# Patient Record
Sex: MALE | Race: WHITE | NOT HISPANIC OR LATINO | Employment: OTHER | URBAN - METROPOLITAN AREA
[De-identification: names, ages, dates, MRNs, and addresses within clinical notes are randomized per-mention and may not be internally consistent; named-entity substitution may affect disease eponyms.]

---

## 2019-05-30 ENCOUNTER — OFFICE VISIT (OUTPATIENT)
Dept: FAMILY MEDICINE | Facility: CLINIC | Age: 64
End: 2019-05-30
Payer: COMMERCIAL

## 2019-05-30 VITALS — DIASTOLIC BLOOD PRESSURE: 88 MMHG | SYSTOLIC BLOOD PRESSURE: 138 MMHG

## 2019-05-30 DIAGNOSIS — D48.5 NEOPLASM OF UNCERTAIN BEHAVIOR OF SKIN: Primary | ICD-10-CM

## 2019-05-30 DIAGNOSIS — Z85.828 HISTORY OF BASAL CELL CARCINOMA: ICD-10-CM

## 2019-05-30 PROCEDURE — 88305 TISSUE EXAM BY PATHOLOGIST: CPT | Mod: TC | Performed by: FAMILY MEDICINE

## 2019-05-30 PROCEDURE — 11301 SHAVE SKIN LESION 0.6-1.0 CM: CPT | Mod: 59 | Performed by: FAMILY MEDICINE

## 2019-05-30 PROCEDURE — 99203 OFFICE O/P NEW LOW 30 MIN: CPT | Mod: 25 | Performed by: FAMILY MEDICINE

## 2019-05-30 PROCEDURE — 11310 SHAVE SKIN LESION 0.5 CM/<: CPT | Mod: 51 | Performed by: FAMILY MEDICINE

## 2019-05-30 PROCEDURE — 11301 SHAVE SKIN LESION 0.6-1.0 CM: CPT | Performed by: FAMILY MEDICINE

## 2019-05-30 NOTE — PROGRESS NOTES
[facility] - PRIMARY CARE SKIN    CC: Lesion(s)  SUBJECTIVE:   Christopher Velazquez is a(n) 64 year old male who presents to clinic today because of various lesions. He does not want a total body skin exam .    Bothersome lesions noticed by the patient or other skin concerns :  Issue One: Lesions are noted on the face, left leg, and left arm.  A spot on the left arm will recurrently scab up.  A spot on the left posterior leg is of uncertain duration.  A spot on the left face occasionally bleeds and often turns red.    Personal Medical History  Skin cancer: YES - basal cell carcinoma on left ear  Eczema Psoriasis Autoimmune   NO NO NO     Family Medical History  Skin cancer: NO - unsure if father had any skin cancer  Eczema Psoriasis Autoimmune   NO NO NO     Sun Exposure History  Previous history of significant sun exposure:  Blistering sunburns: YES.  Tanning beds: YES - when younger.  Sunscreen usage: YES, SPF: 20.  UV-protective clothing usage: NO.  Wide-brimmed hat usage: NO.  UV-protective sunglasses usage: YES.  Mid-day sun avoidance: NO.    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    Refer to electronic medical record (EMR) for past medical history and medications.    INTEGUMENTARY/SKIN: POSITIVE for changing lesions  ROS: 14 point review of systems was negative except the symptoms listed above in the HPI.    This document serves as a record of the services and decisions personally performed and made by Danna Moon MD and was created by Agustín Montaño, a trained medical scribe, based on personal observations and provider statements to the medical scribe.  May 30, 2019 8:35 AM   Agustín Montaño    OBJECTIVE:   GENERAL: healthy, alert and no distress.  SKIN: Perales Skin Type - I-II.  Face, Arms, Legs examined. The dermatoscope was used to help evaluate pigmented lesions.  Skin Pertinent Findings:  Left dorsal mid-forearm: 6 mm in size erythematous slightly raised lesion with a 3 mm  "area of superficial erosion. ? Lichen simplex chronicus with irritation ? Squamous cell carcinoma ? Basal cell carcinoma ? Other      Left distal posterior leg, 13 cm proximal to the calcaneus: 6 mm in size irregularly pigmented brown macule. ? Atypical nevus ? Other      Left proximal posterior leg, 7 cm inferior to posterior knee: 6 mm in size erythematous smooth lightly scaly indurated lesion. ? Squamous cell carcinoma ? Basal cell carcinoma ? Other      Left cheek, 2 cm inferior to left mid-lower eyelid: 2 mm in size smooth raised erythematous lesion. ? Basal cell carcinoma ? Other        ASSESSMENT:     Encounter Diagnoses   Name Primary?     History of basal cell carcinoma Yes     Neoplasm of uncertain behavior of skin      MDM: .  Discussed possible Mohs procedure if pathology indicates skin cancer.    PLAN:   Patient Instructions   FUTURE APPOINTMENTS  Follow up in 1 year(s) for a full-body skin cancer screening.    SUN PROTECTION INSTRUCTIONS  Sun damage can lead to skin cancer and premature aging of the skin.      The best way to protect from sun damage to your skin is to avoid the sun during peak hours (10 am - 2 pm) even on overcast days.    Never use tanning beds. Tanning beds are associated with much higher risks of skin cancer.    All tanning damages the skin. Aim for ivory skin year round and you will have less trouble with your skin in years to come. There is no merit in getting \"a base tan\" before a warm weather vacation, as any tanning indicates your body's response to sun damage.    Stop smoking. Smokers have higher rates of skin cancer and also have premature skin wrinkling.    Use UPF sun-protective clothing, which while more expensive initially provides longer lasting coverage without having to worry about remembering to re-apply.  1. Wear a wide-brimmed hat and sunglasses.   2. Wear sun-protective clothing.  [employer] and other [employer] make sun protective clothing that are stylish, " "comfortable and cool.   [employer] and other [employer] make UV arm sleeves suitable for golfing, gardening and other activities.    Sunscreen instructions:  1. Use sunscreens with Zinc Oxide, Titanium Dioxide or Avobenzone to protect from UVA rays.  2. Use SPF 30-50+ to protect from UVB rays.  3. Re-apply every 2 hours even if water resistant.  4. Apply on your face every day even when cloudy and even in the winter. UVA \"aging rays\" penetrate window glass and are just as strong in the winter as in the summer.    FYI  You should use about 3 tablespoons of sunscreen to protect your whole body. Thus a typical eight ounce bottle of sunscreen should last 4 applications. Remember, that the SPF rating is compromised if you don t apply enough. Most people only apply 1/2 - 1/3 of the amount they need. Also don t forget areas such as your ears, feet, upper back and harder to reach places. Keep in mind that these amounts should be increased for larger body sizes.    Sunscreens with titanium dioxide and/or zinc oxide in the active ingredients are physical blockers as opposed to chemical blockers. Chemical-free sunscreens should not irritate the skin.    Spray-on sunscreens may be used for touch-up application only, not as a base layer. Also, use with caution around small children due to inhalation risk.    SPF means sun protection factor, which is just the degree to which the sunscreen can protect against UVB rays. There is no rating system for UVA rays. SPF is calculated as the time skin will burn when sunscreen is applied vs. skin without sunscreen.    Water resistant sunscreens should be re-applied every 1-2 hours.    Product Recommendations:    Consider use of sunscreen sticks with Zinc Oxide and Titanium Dioxide active ingredients such as Neutrogena Pure&Free Baby Sunscreen Stick.    Good examples include: Blue Terence, EltaMD, Solbar    Good daily moisturizers with SPF: Vanicream, CeraVe.    For sensitive skin, " consider : SkinMedica Essential Defense Mineral Shield Broad Spectrum SPF 35    Men: consider use of Neutrogena Triple Protect Facial Lotion    Avoid retinyl palmitate products.  Avoid combination products that include both sunscreen and insect repellant, as sunscreen should be applied every 2 hours, but insect repellant should not be applied as frequently.    For more information:  https://www.skincancer.org/prevention/sun-protection/sunscreen/sunscreens-safe-and-effective    SKIN CANCER SELF-EXAM INSTRUCTIONS  Check every month in the mirror or with a household member. Be aware of any changes, especially bleeding or tenderness. Also, make sure to check your nails for color changes after removal of nail polish.    For melanoma, check for:  A - Asymmetry. One half unlike the other half.  B - Border. Irregular, scalloped, ragged, notched, blurred or poorly defined borders.  C - Color. Color variations from one area to another, with shades of tan, brown and/or black present. Sometimes white, red or blue.  D - Diameter. Greater than 6 mm (about the size of a pencil eraser). Any new growth of a mole should be concerning and be evaluated.  E - Evolving. A mole or skin lesion that looks different from the rest or is changing in size, shape or color.    For basal cell carcinoma and squamous cell carcinoma, check for:    Sores, shiny bumps, nodules, scaly lesions, or wart-like growths that are itchy, tender, crusting, scabbing, eroding, oozing or bleeding.    Open sores/wounds or reddish/irritated areas that do not heal within 2-3 weeks.    Scar-like areas that are white, yellow or waxy in color.      The patient was counseled about sunscreens and sun avoidance. The patient was counseled to check the skin regularly and report any lesion that is new, changing, itching, scabbing, bleeding or otherwise bothersome. The patient was discharged ambulatory and in stable condition.    TT: 25 minutes.  CT: 15 minutes.    The  information in this document, created by the medical scribe for me, accurately reflects the services I personally performed and the decisions made by me. I have reviewed and approved this document for accuracy prior to leaving the patient care area.  May 30, 2019 8:35 AM  Danna Moon MD  [facility] [place]

## 2019-05-30 NOTE — LETTER
5/30/2019         RE: Christopher Velazquez  [address] [id number]        Dear Colleague,    Thank you for referring your patient, Christopher Velazquez, to the [facility]. Please see a copy of my visit note below.    [facility] - PRIMARY CARE SKIN    CC: Lesion(s)  SUBJECTIVE:   Christopher Velazquez is a(n) 64 year old male who presents to clinic today because of various lesions. He does not want a total body skin exam .    Bothersome lesions noticed by the patient or other skin concerns :  Issue One: Lesions are noted on the face, left leg, and left arm.  A spot on the left arm will recurrently scab up.  A spot on the left posterior leg is of uncertain duration.  A spot on the left face occasionally bleeds and often turns red.    Personal Medical History  Skin cancer: YES - basal cell carcinoma on left ear  Eczema Psoriasis Autoimmune   NO NO NO     Family Medical History  Skin cancer: NO - unsure if father had any skin cancer  Eczema Psoriasis Autoimmune   NO NO NO     Sun Exposure History  Previous history of significant sun exposure:  Blistering sunburns: YES.  Tanning beds: YES - when younger.  Sunscreen usage: YES, SPF: 20.  UV-protective clothing usage: NO.  Wide-brimmed hat usage: NO.  UV-protective sunglasses usage: YES.  Mid-day sun avoidance: NO.    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    Refer to electronic medical record (EMR) for past medical history and medications.    INTEGUMENTARY/SKIN: POSITIVE for changing lesions  ROS: 14 point review of systems was negative except the symptoms listed above in the HPI.    This document serves as a record of the services and decisions personally performed and made by Danna Moon MD and was created by Agustín Montaño, a trained medical scribe, based on personal observations and provider statements to the medical scribe.  May 30, 2019 8:35 AM   Agustín Montaño    OBJECTIVE:   GENERAL: healthy, alert and no  "distress.  SKIN: Perales Skin Type - I-II.  Face, Arms, Legs examined. The dermatoscope was used to help evaluate pigmented lesions.  Skin Pertinent Findings:  Left dorsal mid-forearm: 6 mm in size erythematous slightly raised lesion with a 3 mm area of superficial erosion. ? Lichen simplex chronicus with irritation ? Squamous cell carcinoma ? Basal cell carcinoma ? Other      Left distal posterior leg, 13 cm proximal to the calcaneus: 6 mm in size irregularly pigmented brown macule. ? Atypical nevus ? Other      Left proximal posterior leg, 7 cm inferior to posterior knee: 6 mm in size erythematous smooth lightly scaly indurated lesion. ? Squamous cell carcinoma ? Basal cell carcinoma ? Other      Left cheek, 2 cm inferior to left mid-lower eyelid: 2 mm in size smooth raised erythematous lesion. ? Basal cell carcinoma ? Other        ASSESSMENT:     Encounter Diagnoses   Name Primary?     History of basal cell carcinoma Yes     Neoplasm of uncertain behavior of skin      MDM: .  Discussed possible Mohs procedure if pathology indicates skin cancer.    PLAN:   Patient Instructions   FUTURE APPOINTMENTS  Follow up in 1 year(s) for a full-body skin cancer screening.    SUN PROTECTION INSTRUCTIONS  Sun damage can lead to skin cancer and premature aging of the skin.      The best way to protect from sun damage to your skin is to avoid the sun during peak hours (10 am - 2 pm) even on overcast days.    Never use tanning beds. Tanning beds are associated with much higher risks of skin cancer.    All tanning damages the skin. Aim for ivory skin year round and you will have less trouble with your skin in years to come. There is no merit in getting \"a base tan\" before a warm weather vacation, as any tanning indicates your body's response to sun damage.    Stop smoking. Smokers have higher rates of skin cancer and also have premature skin wrinkling.    Use UPF sun-protective clothing, which while more expensive initially " "provides longer lasting coverage without having to worry about remembering to re-apply.  1. Wear a wide-brimmed hat and sunglasses.   2. Wear sun-protective clothing.  [employer] and other [employer] make sun protective clothing that are stylish, comfortable and cool.   [employer] and other [employer] make UV arm sleeves suitable for golfing, gardening and other activities.    Sunscreen instructions:  1. Use sunscreens with Zinc Oxide, Titanium Dioxide or Avobenzone to protect from UVA rays.  2. Use SPF 30-50+ to protect from UVB rays.  3. Re-apply every 2 hours even if water resistant.  4. Apply on your face every day even when cloudy and even in the winter. UVA \"aging rays\" penetrate window glass and are just as strong in the winter as in the summer.    FYI  You should use about 3 tablespoons of sunscreen to protect your whole body. Thus a typical eight ounce bottle of sunscreen should last 4 applications. Remember, that the SPF rating is compromised if you don t apply enough. Most people only apply 1/2 - 1/3 of the amount they need. Also don t forget areas such as your ears, feet, upper back and harder to reach places. Keep in mind that these amounts should be increased for larger body sizes.    Sunscreens with titanium dioxide and/or zinc oxide in the active ingredients are physical blockers as opposed to chemical blockers. Chemical-free sunscreens should not irritate the skin.    Spray-on sunscreens may be used for touch-up application only, not as a base layer. Also, use with caution around small children due to inhalation risk.    SPF means sun protection factor, which is just the degree to which the sunscreen can protect against UVB rays. There is no rating system for UVA rays. SPF is calculated as the time skin will burn when sunscreen is applied vs. skin without sunscreen.    Water resistant sunscreens should be re-applied every 1-2 hours.    Product Recommendations:    Consider use of sunscreen " sticks with Zinc Oxide and Titanium Dioxide active ingredients such as Neutrogena Pure&Free Baby Sunscreen Stick.    Good examples include: Blue Lizard, EltaMD, Solbar    Good daily moisturizers with SPF: Vanicream, CeraVe.    For sensitive skin, consider : SkinMedica Essential Defense Mineral Shield Broad Spectrum SPF 35    Men: consider use of Neutrogena Triple Protect Facial Lotion    Avoid retinyl palmitate products.  Avoid combination products that include both sunscreen and insect repellant, as sunscreen should be applied every 2 hours, but insect repellant should not be applied as frequently.    For more information:  https://www.skincancer.org/prevention/sun-protection/sunscreen/sunscreens-safe-and-effective    SKIN CANCER SELF-EXAM INSTRUCTIONS  Check every month in the mirror or with a household member. Be aware of any changes, especially bleeding or tenderness. Also, make sure to check your nails for color changes after removal of nail polish.    For melanoma, check for:  A - Asymmetry. One half unlike the other half.  B - Border. Irregular, scalloped, ragged, notched, blurred or poorly defined borders.  C - Color. Color variations from one area to another, with shades of tan, brown and/or black present. Sometimes white, red or blue.  D - Diameter. Greater than 6 mm (about the size of a pencil eraser). Any new growth of a mole should be concerning and be evaluated.  E - Evolving. A mole or skin lesion that looks different from the rest or is changing in size, shape or color.    For basal cell carcinoma and squamous cell carcinoma, check for:    Sores, shiny bumps, nodules, scaly lesions, or wart-like growths that are itchy, tender, crusting, scabbing, eroding, oozing or bleeding.    Open sores/wounds or reddish/irritated areas that do not heal within 2-3 weeks.    Scar-like areas that are white, yellow or waxy in color.      The patient was counseled about sunscreens and sun avoidance. The patient was  counseled to check the skin regularly and report any lesion that is new, changing, itching, scabbing, bleeding or otherwise bothersome. The patient was discharged ambulatory and in stable condition.    TT: 25 minutes.  CT: 15 minutes.    The information in this document, created by the medical scribe for me, accurately reflects the services I personally performed and the decisions made by me. I have reviewed and approved this document for accuracy prior to leaving the patient care area.  May 30, 2019 8:35 AM  Danna Moon MD  [facility] [place]    Again, thank you for allowing me to participate in the care of your patient.        Sincerely,        Danna Moon MD

## 2019-05-30 NOTE — PROCEDURES
Name : Shave Excision  Indication : Excision of tissue for pathology evaluation.  Location(s) : Left dorsal mid-forearm: 6 mm in size erythematous slightly raised lesion with a 3 mm area of superficial erosion. ? Lichen simplex chronicus with irritation ? Squamous cell carcinoma ? Basal cell carcinoma ? Other.  Completed by : Carla Moon MD  Photo Taken : yes.  Anesthesia : Patient was anesthetized by infiltrating the area surrounding the lesion with 1% lidocaine.   epinephrine [id number] : Yes.  Note : Discussed the risk of pain, infection, scarring, hypo- or hyperpigmentation and recurrence or need for re-treatment. The benefits of treatment and alternative treatments were also discussed.    During this procedure, the universal protocol was utilized. The patient's identity was confirmed by no less than two patient identifiers, correct procedure was verified, correct site was verified and marked as applicable and a final pause was completed.    Sterile technique was used throughout the procedure. The skin was cleaned and prepped with surgical cleanser. Once adequate anesthesia was obtained, the lesion was removed with a deep scallop shave procedure. The specimen was sent to pathology.    Direct pressure and aluminum chloride and monopolar cautery was applied for hemostasis. No bleeding was present upon the completion of the procedure. The wound was coated with antibacterial ointment. A dry sterile dressing was applied. Patient tolerated the procedure well and left in satisfactory condition.    Primary provider and referring provider will be informed regarding the tissue report when it returns.    Name : Shave Excision  Indication : Excision of tissue for pathology evaluation.  Location(s) : Left distal posterior leg, 13 cm proximal to the calcaneus: 6 mm in size irregularly pigmented brown macule. ? Atypical nevus ? Other.  Completed by : Carla Moon MD  Photo Taken : YES.  Anesthesia : Patient was anesthetized  by infiltrating the area surrounding the lesion with 1% lidocaine.   epinephrine [id number] : Yes.  Note : Discussed the risk of pain, infection, scarring, hypo- or hyperpigmentation and recurrence or need for re-treatment. The benefits of treatment and alternative treatments were also discussed.    During this procedure, the universal protocol was utilized. The patient's identity was confirmed by no less than two patient identifiers, correct procedure was verified, correct site was verified and marked as applicable and a final pause was completed.    Sterile technique was used throughout the procedure. The skin was cleaned and prepped with surgical cleanser. Once adequate anesthesia was obtained, the lesion was removed with a deep scallop shave procedure. The specimen was sent to pathology.    Direct pressure and aluminum chloride and monopolar cautery was applied for hemostasis. No bleeding was present upon the completion of the procedure. The wound was coated with antibacterial ointment. A dry sterile dressing was applied. Patient tolerated the procedure well and left in satisfactory condition.    Primary provider and referring provider will be informed regarding the tissue report when it returns.    Name : Shave Excision  Indication : Excision of tissue for pathology evaluation.  Location(s) : Left proximal posterior leg, 7 cm inferior to posterior knee: 6 mm in size erythematous smooth lightly scaly indurated lesion. ? Squamous cell carcinoma ? Basal cell carcinoma ? Other.  Completed by : Carla Moon MD  Photo Taken : yes.  Anesthesia : Patient was anesthetized by infiltrating the area surrounding the lesion with 1% lidocaine.   epinephrine [id number] : Yes.  Note : Discussed the risk of pain, infection, scarring, hypo- or hyperpigmentation and recurrence or need for re-treatment. The benefits of treatment and alternative treatments were also discussed.    During this procedure, the universal protocol was  utilized. The patient's identity was confirmed by no less than two patient identifiers, correct procedure was verified, correct site was verified and marked as applicable and a final pause was completed.    Sterile technique was used throughout the procedure. The skin was cleaned and prepped with surgical cleanser. Once adequate anesthesia was obtained, the lesion was removed with a deep scallop shave procedure. The specimen was sent to pathology.    Direct pressure and aluminum chloride and monopolar cautery was applied for hemostasis. No bleeding was present upon the completion of the procedure. The wound was coated with antibacterial ointment. A dry sterile dressing was applied. Patient tolerated the procedure well and left in satisfactory condition.    Primary provider and referring provider will be informed regarding the tissue report when it returns.    Name : Shave Excision  Indication : Excision of tissue for pathology evaluation.  Location(s) : Left cheek: 2 cm inferior to left mid-lower eyelid: 2 mm in size smooth raised erythematous lesion. ? Basal cell carcinoma ? Other.  Completed by : Carla Moon MD  Photo Taken : yes.  Anesthesia : Patient was anesthetized by infiltrating the area surrounding the lesion with 1% lidocaine.   epinephrine [id number] : Yes.  Note : Discussed the risk of pain, infection, scarring, hypo- or hyperpigmentation and recurrence or need for re-treatment. The benefits of treatment and alternative treatments were also discussed.    During this procedure, the universal protocol was utilized. The patient's identity was confirmed by no less than two patient identifiers, correct procedure was verified, correct site was verified and marked as applicable and a final pause was completed.    Sterile technique was used throughout the procedure. The skin was cleaned and prepped with surgical cleanser. Once adequate anesthesia was obtained, the lesion was removed with a deep scallop shave  procedure. The specimen was sent to pathology.    Direct pressure and aluminum chloride and monopolar cautery was applied for hemostasis. No bleeding was present upon the completion of the procedure. The wound was coated with antibacterial ointment. A dry sterile dressing was applied. Patient tolerated the procedure well and left in satisfactory condition.    Primary provider and referring provider will be informed regarding the tissue report when it returns.

## 2019-05-30 NOTE — PATIENT INSTRUCTIONS
"FUTURE APPOINTMENTS  Follow up for annual full-body skin cancer screenings.  Follow up per pathology report.    WOUND CARE INSTRUCTIONS  1. Wash hands before every dressing change.  2. After 24 hours, change dressing daily.  3. Wash the wound area with a mild soap, then rinse.  4. Gently pat dry with a sterile gauze or Q-tip.  5. Using a Q-tip, apply Vaseline or Aquaphor only over entire wound. Do NOT use Neosporin - as many people react to neomycin.  6. Finally, cover with a bandage or sterile non-stick gauze with micropore paper tape.  7. Repeat once daily until wound has healed.      Soap, water and shampoo will not hurt this area.    Do not go swimming or take baths, but showering is encouraged.    Limit use of the area where the procedure was done for a few days to allow for optimal healing.    If you experience bleeding:  Wash hands and hold firm pressure on the area for 10 minutes without checking to see if the bleeding has stopped. \"Checking\" pulls off the protective wound clot and restarts the bleeding all over again. Re-apply pressure for 10 minutes if necessary to stop bleeding.  Use additional sterile gauze and tape to maintain pressure once bleeding has stopped.  If bleeding continues, then call back to clinic at [phone].    Signs of Infection:  Infection can occur in any area where skin has been disrupted.  If you notice persistent redness, swelling, colored drainage, increasing pain, fever or other signs of infection, please call us at: [phone] and ask to have me or my colleague paged. We will call you back to discuss.    Pathology Results:  You will be notified, generally via letter or MyChart, in approximately 10 days. If there is anything we need to discuss or further treatment needed, I will call you to discuss it.    PATIENT INFORMATION : WOUNDS  During the healing process you will notice a number of changes. All wounds develop a small halo of redness surrounding the wound.  This " "means healing is occurring. Severe itching with extensive redness usually indicates sensitivity to the ointment or bandage tape used to dress the wound.  You should call our office if this develops.      Swelling  and/or discoloration around your surgical site is common, particularly when performed around the eye.    All wounds normally drain.  The larger the wound the more drainage there will be.  After 7-10 days, you will notice the wound beginning to shrink and new skin will begin to grow.  The wound is healed when you can see skin has formed over the entire area.  A healed wound has a healthy, shiny look to the surface and is red to dark pink in color to normalize.  Wounds may take approximately 4-6 weeks to heal.  Larger wounds may take 6-8 weeks. After the wound is healed you may discontinue dressing changes.    You may experience a sensation of tightness as your wound heals. This is normal and will gradually subside.    Your healed wound may be sensitive to temperature changes. This sensitivity improves with time, but if you re having a lot of discomfort, try to avoid temperature extremes.    Patients frequently experience itching after their wound appears to have healed because of the continue healing under the skin.  Plain Vaseline will help relieve the itching.    SUN PROTECTION INSTRUCTIONS  Sun damage can lead to skin cancer and premature aging of the skin.      The best way to protect from sun damage to your skin is to avoid the sun during peak hours (10 am - 2 pm) even on overcast days.    Never use tanning beds. Tanning beds are associated with much higher risks of skin cancer.    All tanning damages the skin. Aim for ivory skin year round and you will have less trouble with your skin in years to come. There is no merit in getting \"a base tan\" before a warm weather vacation, as any tanning indicates your body's response to sun damage.    Stop smoking. Smokers have higher rates of skin cancer and also " "have premature skin wrinkling.    Use UPF sun-protective clothing, which while more expensive initially provides longer lasting coverage without having to worry about remembering to re-apply.  1. Wear a wide-brimmed hat and sunglasses.   2. Wear sun-protective clothing.  [employer] and other [employer] make sun protective clothing that are stylish, comfortable and cool.   [employer] and other [employer] make UV arm sleeves suitable for golfing, gardening and other activities.    Sunscreen instructions:  1. Use sunscreens with Zinc Oxide, Titanium Dioxide or Avobenzone to protect from UVA rays.  2. Use SPF 30-50+ to protect from UVB rays.  3. Re-apply every 2 hours even if water resistant.  4. Apply on your face every day even when cloudy and even in the winter. UVA \"aging rays\" penetrate window glass and are just as strong in the winter as in the summer.    FYI  You should use about 3 tablespoons of sunscreen to protect your whole body. Thus a typical eight ounce bottle of sunscreen should last 4 applications. Remember, that the SPF rating is compromised if you don t apply enough. Most people only apply 1/2 - 1/3 of the amount they need. Also don t forget areas such as your ears, feet, upper back and harder to reach places. Keep in mind that these amounts should be increased for larger body sizes.    Sunscreens with titanium dioxide and/or zinc oxide in the active ingredients are physical blockers as opposed to chemical blockers. Chemical-free sunscreens should not irritate the skin.    Spray-on sunscreens may be used for touch-up application only, not as a base layer. Also, use with caution around small children due to inhalation risk.    SPF means sun protection factor, which is just the degree to which the sunscreen can protect against UVB rays. There is no rating system for UVA rays. SPF is calculated as the time skin will burn when sunscreen is applied vs. skin without sunscreen.    Water resistant " sunscreens should be re-applied every 1-2 hours.    Product Recommendations:    Consider use of sunscreen sticks with Zinc Oxide and Titanium Dioxide active ingredients such as Neutrogena Pure&Free Baby Sunscreen Stick.    Good examples include: Blue Lizard, EltaMD, Solbar    Good daily moisturizers with SPF: Vanicream, CeraVe.    For sensitive skin, consider : SkinMedica Essential Defense Mineral Shield Broad Spectrum SPF 35    Men: consider use of Neutrogena Triple Protect Facial Lotion    Avoid retinyl palmitate products.  Avoid combination products that include both sunscreen and insect repellant, as sunscreen should be applied every 2 hours, but insect repellant should not be applied as frequently.    For more information:  https://www.skincancer.org/prevention/sun-protection/sunscreen/sunscreens-safe-and-effective    SKIN CANCER SELF-EXAM INSTRUCTIONS  Check every month in the mirror or with a household member. Be aware of any changes, especially bleeding or tenderness. Also, make sure to check your nails for color changes after removal of nail polish.    For melanoma, check for:  A - Asymmetry. One half unlike the other half.  B - Border. Irregular, scalloped, ragged, notched, blurred or poorly defined borders.  C - Color. Color variations from one area to another, with shades of tan, brown and/or black present. Sometimes white, red or blue.  D - Diameter. Greater than 6 mm (about the size of a pencil eraser). Any new growth of a mole should be concerning and be evaluated.  E - Evolving. A mole or skin lesion that looks different from the rest or is changing in size, shape or color.    For basal cell carcinoma and squamous cell carcinoma, check for:    Sores, shiny bumps, nodules, scaly lesions, or wart-like growths that are itchy, tender, crusting, scabbing, eroding, oozing or bleeding.    Open sores/wounds or reddish/irritated areas that do not heal within 2-3 weeks.    Scar-like areas that are white,  yellow or waxy in color.

## 2019-06-04 LAB — COPATH REPORT: NORMAL

## 2019-06-12 ENCOUNTER — TELEPHONE (OUTPATIENT)
Dept: FAMILY MEDICINE | Facility: CLINIC | Age: 64
End: 2019-06-12

## 2019-06-12 NOTE — TELEPHONE ENCOUNTER
patient notified of test results and mohs procedure explained- appointment scheduled- letter mailed.    Katelyn SEBASTIAN RN  [facility]  [phone]  [facility]   [phone]

## 2019-06-12 NOTE — TELEPHONE ENCOUNTER
Left message on answering machine for patient to call back.    Katelyn Bateman,RN BSN  [facility]  [phone]

## 2019-06-12 NOTE — LETTER
[facility]  [address]  [address]  [phone]        6/12/2019       [facility]quez  [address] [facility] [id number]      Dear Christopher:    You are scheduled for Mohs Surgery on: Thursday, August 1, 2019 at 7:15 am    Please check in at [facility], [address].     You don't need to arrive more than 5-10 minutes prior to your appointment time.     Be sure to eat a good breakfast and bathe and wash your hair prior to surgery.     If you are taking any anti-coagulants that are prescribed by your Doctor (such as Coumadin/Warfarin, Plavix, Aspirin, Ibuprofen), please continue taking them.     However, if you are taking anti-coagulants over the counter without a Doctor's order for a medical condition, please discontinue them 10 days prior to surgery.     Please wear loose comfortable clothing as it could possibly be 4-6 hours until your surgery is completed depending upon how many layers of tissue need to be removed.      Thank you,    BRIDGETTE James MD

## 2019-06-12 NOTE — TELEPHONE ENCOUNTER
----- Message from Tristan Hearn MD sent at 6/12/2019 11:43 AM CDT -----  Please call and explain :       On the left posterior leg- basal cell carcinoma , because of the location on the lower leg would recommend MOHS with Dr. James because if is a more tissue sparing technique and allows for complete removal of the basal cell carcinoma . The lesions on the other locations were all benign.       Recommend skin exam in 6 months.       Will need referral for Dr. James.    Thank you,   TRISTAN HEARN M.D.

## 2019-08-26 NOTE — PROGRESS NOTES
Surgical Office Location:  [facility]  [address] [place] [id number]

## 2019-08-29 ENCOUNTER — OFFICE VISIT (OUTPATIENT)
Dept: DERMATOLOGY | Facility: CLINIC | Age: 64
End: 2019-08-29
Payer: COMMERCIAL

## 2019-08-29 VITALS — HEART RATE: 61 BPM | DIASTOLIC BLOOD PRESSURE: 97 MMHG | OXYGEN SATURATION: 97 % | SYSTOLIC BLOOD PRESSURE: 153 MMHG

## 2019-08-29 DIAGNOSIS — L81.4 LENTIGO: ICD-10-CM

## 2019-08-29 DIAGNOSIS — L82.1 SEBORRHEIC KERATOSIS: ICD-10-CM

## 2019-08-29 DIAGNOSIS — C44.719 BASAL CELL CARCINOMA (BCC) OF LEFT FOOT: ICD-10-CM

## 2019-08-29 DIAGNOSIS — D18.01 ANGIOMA OF SKIN: ICD-10-CM

## 2019-08-29 DIAGNOSIS — Z85.828 HISTORY OF SKIN CANCER: Primary | ICD-10-CM

## 2019-08-29 PROCEDURE — 17311 MOHS 1 STAGE H/N/HF/G: CPT | Performed by: DERMATOLOGY

## 2019-08-29 PROCEDURE — 99243 OFF/OP CNSLTJ NEW/EST LOW 30: CPT | Mod: 25 | Performed by: DERMATOLOGY

## 2019-08-29 NOTE — PATIENT INSTRUCTIONS
Wound Care Instructions     FOR SUPERFICIAL WOUNDS     [facility] [phone]    [facility] [phone]                       AFTER 24 HOURS YOU SHOULD REMOVE THE BANDAGE AND BEGIN DAILY DRESSING CHANGES AS FOLLOWS:     1) Remove Dressing.     2) Clean and dry the area with tap water using a Q-tip or sterile gauze pad.     3) Apply Vaseline, Aquaphor, Polysporin ointment or Bacitracin ointment over entire wound.  Do NOT use Neosporin ointment.     4) Cover the wound with a band-aid, or a sterile non-stick gauze pad and micropore paper tape      REPEAT THESE INSTRUCTIONS AT LEAST ONCE A DAY UNTIL THE WOUND HAS COMPLETELY HEALED.    It is an old wives tale that a wound heals better when it is exposed to air and allowed to dry out. The wound will heal faster with a better cosmetic result if it is kept moist with ointment and covered with a bandage.    **Do not let the wound dry out.**      Supplies Needed:      *Cotton tipped applicators (Q-tips)    *Polysporin Ointment or Bacitracin Ointment (NOT NEOSPORIN)    *Band-aids or non-stick gauze pads and micropore paper tape.      PATIENT INFORMATION:    During the healing process you will notice a number of changes. All wounds develop a small halo of redness surrounding the wound.  This means healing is occurring. Severe itching with extensive redness usually indicates sensitivity to the ointment or bandage tape used to dress the wound.  You should call our office if this develops.      Swelling  and/or discoloration around your surgical site is common, particularly when performed around the eye.    All wounds normally drain.  The larger the wound the more drainage there will be.  After 7-10 days, you will notice the wound beginning to shrink and new skin will begin to grow.  The wound is healed when you can see skin has formed over the entire area.  A healed wound has a healthy, shiny look to the surface and is red to dark pink in color  to normalize.  Wounds may take approximately 4-6 weeks to heal.  Larger wounds may take 6-8 weeks.  After the wound is healed you may discontinue dressing changes.    You may experience a sensation of tightness as your wound heals. This is normal and will gradually subside.    Your healed wound may be sensitive to temperature changes. This sensitivity improves with time, but if you re having a lot of discomfort, try to avoid temperature extremes.    Patients frequently experience itching after their wound appears to have healed because of the continue healing under the skin.  Plain Vaseline will help relieve the itching.        POSSIBLE COMPLICATIONS    BLEEDIN. Leave the bandage in place.  2. Use tightly rolled up gauze or a cloth to apply direct pressure over the bandage for 30  minutes.  3. Reapply pressure for an additional 30 minutes if necessary  4. Use additional gauze and tape to maintain pressure once the bleeding has stopped.

## 2019-08-29 NOTE — PROGRESS NOTES
Christopher Velazquez , a 64 year old year old male patient, I was asked to see by DR. Fuentes for basal cell carcinoma on left leg. Patient states this has been present for a while.  Patient reports the following symptoms:  Not healing .  Patient reports the following previous treatments none.  Patient reports the following modifying factors none.  Associated symptoms: none.  Patient has no other skin complaints today.  Remainder of the HPI, Meds, PMH, Allergies, FH, and SH was reviewed in chart.    Pertinent Hx:   Non-melanoma skin cancer   Past Medical History:   Diagnosis Date     BCC (basal cell carcinoma of skin)     L ear       Past Surgical History:   Procedure Laterality Date     ARTHROSCOPY SHOULDER RT/LT  1999    L     HC CLOSED TX CLAVICULAR FX W/O MANIPULATION  2003    R     HC REPAIR OF NASAL SEPTUM  1994    septoplasty, rhinoplasty        Family History   Problem Relation Age of Onset     Neurologic Disorder Mother         D:  ALS 75     Family History Negative Father         B; 1920's     Cancer Sister         lung     Family History Negative Brother      Family History Negative Brother      Family History Negative Brother      Family History Negative Brother      Family History Negative Sister      Family History Negative Sister        Social History     Socioeconomic History     Marital status:      Spouse name: Not on file     Number of children: 1     Years of education: Not on file     Highest education level: Not on file   Occupational History     Occupation:      Employer: [employer]   Social Needs     Financial resource strain: Not on file     Food insecurity:     Worry: Not on file     Inability: Not on file     Transportation needs:     Medical: Not on file     Non-medical: Not on file   Tobacco Use     Smoking status: Never Smoker     Smokeless tobacco: Never Used   Substance and Sexual Activity     Alcohol use: Yes     Comment: wine on weekends- 5 drinks/wk     Drug  use: Never     Sexual activity: Yes   Lifestyle     Physical activity:     Days per week: Not on file     Minutes per session: Not on file     Stress: Not on file   Relationships     Social connections:     Talks on phone: Not on file     Gets together: Not on file     Attends Denominational service: Not on file     Active member of club or organization: Not on file     Attends meetings of clubs or organizations: Not on file     Relationship status: Not on file     Intimate partner violence:     Fear of current or ex partner: Not on file     Emotionally abused: Not on file     Physically abused: Not on file     Forced sexual activity: Not on file   Other Topics Concern     Parent/sibling w/ CABG, MI or angioplasty before 65F 55M? Not Asked   Social History Narrative     Not on file       Outpatient Encounter Medications as of 8/29/2019   Medication Sig Dispense Refill     MULTI-VITAMIN OR TABS 1 TABLET DAILY       [DISCONTINUED] HYDROcodone-acetaminophen 5-325 MG per tablet Take 1 tablet by mouth every 4 hours as needed for pain. 21 tablet 0     No facility-administered encounter medications on file as of 8/29/2019.              Review Of Systems  Skin: As above  Eyes: negative  Ears/Nose/Throat: negative  Respiratory: No shortness of breath, dyspnea on exertion, cough, or hemoptysis  Cardiovascular: negative  Gastrointestinal: negative  Genitourinary: negative  Musculoskeletal: negative  Neurologic: negative  Psychiatric: negative  Hematologic/Lymphatic/Immunologic: negative  Endocrine: negative      O:   NAD, WDWN, Alert & Oriented, Mood & Affect wnl, Vitals stable   Here today alone   BP (!) 153/97   Pulse 61   SpO2 97%    General appearance berenice ii   Vitals stable   Alert, oriented and in no acute distress        Stuck on papules and brown macules on trunk and ext    Red papules on trunk  L post ankle 1.4cm red scaly plaque      The remainder of expanded problem focused exam was unremarkable; the following areas  were examined:  scalp/hair, conjunctiva/lids, face, neck, lips, chest, digits/nails, RUE, LUE.      Eyes: Conjunctivae/lids:Normal     ENT: Lips, buccal mucosa, tongue: normal    MSK:Normal    Cardiovascular: peripheral edema none    Pulm: Breathing Normal    Neuro/Psych: Orientation:Normal; Mood/Affect:Normal      A/P:  1. Hx of non-melanoma skin cancer, seborrheic keratosis, eltnigo, angioma  2. Ankle basal cell carcinoma   BENIGN LESIONS DISCUSSED WITH PATIENT:  I discussed the specifics of tumor, prognosis, and genetics of benign lesions.  I explained that treatment of these lesions would be purely cosmetic and not medically neccessary.  I discussed with patient different removal options including excision, cautery and /or laser.      Nature and genetics of benign skin lesions dicussed with patient.  Signs and Symptoms of skin cancer discussed with patient.  Patient encouraged to perform monthly skin exams.  UV precautions reviewed with patient.    Skin care regimen reviewed with patient: Eliminate harsh soaps, i.e. Dial, zest, irsih spring; Mild soaps such as Cetaphil or Dove sensitive skin, avoid hot or cold showers, aggressive use of emollients including vanicream, cetaphil or cerave discussed with patient.    Risks of non-melanoma skin cancer discussed with patient   Return to clinic 6 months    PROCEDURE NOTE  L post ankle 1.4cm basal cell carcinoma   MOHS:   Location    After PGACAC discussed with patient, decision for Mohs surgery was made. Indication for Mohs was Location. Patient confirmed the site with Dr. James.  After anesthesia with [facility], the tumor was excised using standard Mohs technique in 1 stages(s).  CLEAR MARGINS OBTAINED and Final defect size was 2 x 2.1 cm.       REPAIR SECOND INTENT: We discussed the options for wound management in full with the patient including risks/benefits/possible outcomes. Decision made to allow the wound to heal by second intention. EBL minimal; complications  none; wound care routine.  The patient was discharged in good condition and will return in one month or prn for wound evaluation.

## 2019-08-29 NOTE — LETTER
8/29/2019         RE: Christopher Velazquez  [address] [facility] [id number]        Dear Colleague,    Thank you for referring your patient, Christopher Velazquez, to the [facility]. Please see a copy of my visit note below.    Surgical Office Location:  [facility]  [address] [place] [address]      Christopher Velazquez , a 64 year old year old male patient, I was asked to see by DR. Fuentes for basal cell carcinoma on left leg. Patient states this has been present for a while.  Patient reports the following symptoms:  Not healing .  Patient reports the following previous treatments none.  Patient reports the following modifying factors none.  Associated symptoms: none.  Patient has no other skin complaints today.  Remainder of the HPI, Meds, PMH, Allergies, FH, and SH was reviewed in chart.    Pertinent Hx:   Non-melanoma skin cancer   Past Medical History:   Diagnosis Date     BCC (basal cell carcinoma of skin)     L ear       Past Surgical History:   Procedure Laterality Date     ARTHROSCOPY SHOULDER RT/LT  1999    L     HC CLOSED TX CLAVICULAR FX W/O MANIPULATION  2003    R     HC REPAIR OF NASAL SEPTUM  1994    septoplasty, rhinoplasty        Family History   Problem Relation Age of Onset     Neurologic Disorder Mother         D:  ALS 75     Family History Negative Father         B; 1920's     Cancer Sister         lung     Family History Negative Brother      Family History Negative Brother      Family History Negative Brother      Family History Negative Brother      Family History Negative Sister      Family History Negative Sister        Social History     Socioeconomic History     Marital status:      Spouse name: Not on file     Number of children: 1     Years of education: Not on file     Highest education level: Not on file   Occupational History     Occupation:      Employer: [employer]   Social Needs     Financial resource  strain: Not on file     Food insecurity:     Worry: Not on file     Inability: Not on file     Transportation needs:     Medical: Not on file     Non-medical: Not on file   Tobacco Use     Smoking status: Never Smoker     Smokeless tobacco: Never Used   Substance and Sexual Activity     Alcohol use: Yes     Comment: wine on weekends- 5 drinks/wk     Drug use: Never     Sexual activity: Yes   Lifestyle     Physical activity:     Days per week: Not on file     Minutes per session: Not on file     Stress: Not on file   Relationships     Social connections:     Talks on phone: Not on file     Gets together: Not on file     Attends Mosque service: Not on file     Active member of club or organization: Not on file     Attends meetings of clubs or organizations: Not on file     Relationship status: Not on file     Intimate partner violence:     Fear of current or ex partner: Not on file     Emotionally abused: Not on file     Physically abused: Not on file     Forced sexual activity: Not on file   Other Topics Concern     Parent/sibling w/ CABG, MI or angioplasty before 65F 55M? Not Asked   Social History Narrative     Not on file       Outpatient Encounter Medications as of 8/29/2019   Medication Sig Dispense Refill     MULTI-VITAMIN OR TABS 1 TABLET DAILY       [DISCONTINUED] HYDROcodone-acetaminophen 5-325 MG per tablet Take 1 tablet by mouth every 4 hours as needed for pain. 21 tablet 0     No facility-administered encounter medications on file as of 8/29/2019.              Review Of Systems  Skin: As above  Eyes: negative  Ears/Nose/Throat: negative  Respiratory: No shortness of breath, dyspnea on exertion, cough, or hemoptysis  Cardiovascular: negative  Gastrointestinal: negative  Genitourinary: negative  Musculoskeletal: negative  Neurologic: negative  Psychiatric: negative  Hematologic/Lymphatic/Immunologic: negative  Endocrine: negative      O:   NAD, WDWN, Alert & Oriented, Mood & Affect wnl, Vitals  stable   Here today alone   BP (!) 153/97   Pulse 61   SpO2 97%    General appearance berenice ii   Vitals stable   Alert, oriented and in no acute distress        Stuck on papules and brown macules on trunk and ext    Red papules on trunk  L post ankle 1.4cm red scaly plaque      The remainder of expanded problem focused exam was unremarkable; the following areas were examined:  scalp/hair, conjunctiva/lids, face, neck, lips, chest, digits/nails, RUE, LUE.      Eyes: Conjunctivae/lids:Normal     ENT: Lips, buccal mucosa, tongue: normal    MSK:Normal    Cardiovascular: peripheral edema none    Pulm: Breathing Normal    Neuro/Psych: Orientation:Normal; Mood/Affect:Normal      A/P:  1. Hx of non-melanoma skin cancer, seborrheic keratosis, eltnigo, angioma  2. Ankle basal cell carcinoma   BENIGN LESIONS DISCUSSED WITH PATIENT:  I discussed the specifics of tumor, prognosis, and genetics of benign lesions.  I explained that treatment of these lesions would be purely cosmetic and not medically neccessary.  I discussed with patient different removal options including excision, cautery and /or laser.      Nature and genetics of benign skin lesions dicussed with patient.  Signs and Symptoms of skin cancer discussed with patient.  Patient encouraged to perform monthly skin exams.  UV precautions reviewed with patient.    Skin care regimen reviewed with patient: Eliminate harsh soaps, i.e. Dial, zest, irsih spring; Mild soaps such as Cetaphil or Dove sensitive skin, avoid hot or cold showers, aggressive use of emollients including vanicream, cetaphil or cerave discussed with patient.    Risks of non-melanoma skin cancer discussed with patient   Return to clinic 6 months    PROCEDURE NOTE  L post ankle 1.4cm basal cell carcinoma   MOHS:   Location    After PGACAC discussed with patient, decision for Mohs surgery was made. Indication for Mohs was Location. Patient confirmed the site with Dr. James.  After anesthesia with LEC,  the tumor was excised using standard Mohs technique in 1 stages(s).  CLEAR MARGINS OBTAINED and Final defect size was 2 x 2.1 cm.       REPAIR SECOND INTENT: We discussed the options for wound management in full with the patient including risks/benefits/possible outcomes. Decision made to allow the wound to heal by second intention. EBL minimal; complications none; wound care routine.  The patient was discharged in good condition and will return in one month or prn for wound evaluation.      Again, thank you for allowing me to participate in the care of your patient.        Sincerely,        Yahir James MD

## 2019-09-09 ENCOUNTER — OFFICE VISIT (OUTPATIENT)
Dept: INTERNAL MEDICINE | Facility: CLINIC | Age: 64
End: 2019-09-09
Payer: COMMERCIAL

## 2019-09-09 VITALS
TEMPERATURE: 98.8 F | HEART RATE: 63 BPM | DIASTOLIC BLOOD PRESSURE: 98 MMHG | BODY MASS INDEX: 31.22 KG/M2 | HEIGHT: 71 IN | RESPIRATION RATE: 16 BRPM | SYSTOLIC BLOOD PRESSURE: 142 MMHG | WEIGHT: 223 LBS | OXYGEN SATURATION: 95 %

## 2019-09-09 DIAGNOSIS — Z12.11 SCREEN FOR COLON CANCER: ICD-10-CM

## 2019-09-09 DIAGNOSIS — I35.0 AORTIC VALVE STENOSIS, ETIOLOGY OF CARDIAC VALVE DISEASE UNSPECIFIED: ICD-10-CM

## 2019-09-09 DIAGNOSIS — Z23 NEED FOR SHINGLES VACCINE: ICD-10-CM

## 2019-09-09 DIAGNOSIS — R03.0 ELEVATED BLOOD PRESSURE READING WITHOUT DIAGNOSIS OF HYPERTENSION: ICD-10-CM

## 2019-09-09 DIAGNOSIS — Z12.5 SCREENING FOR PROSTATE CANCER: ICD-10-CM

## 2019-09-09 DIAGNOSIS — Z00.00 ENCOUNTER FOR ROUTINE ADULT HEALTH EXAMINATION WITHOUT ABNORMAL FINDINGS: Primary | ICD-10-CM

## 2019-09-09 DIAGNOSIS — Z23 NEED FOR TDAP VACCINATION: ICD-10-CM

## 2019-09-09 DIAGNOSIS — Z13.6 CARDIOVASCULAR SCREENING; LDL GOAL LESS THAN 160: ICD-10-CM

## 2019-09-09 PROBLEM — I10 BENIGN ESSENTIAL HYPERTENSION: Status: ACTIVE | Noted: 2019-09-09

## 2019-09-09 PROCEDURE — 99396 PREV VISIT EST AGE 40-64: CPT | Mod: 25 | Performed by: INTERNAL MEDICINE

## 2019-09-09 PROCEDURE — 90471 IMMUNIZATION ADMIN: CPT | Performed by: INTERNAL MEDICINE

## 2019-09-09 PROCEDURE — 90472 IMMUNIZATION ADMIN EACH ADD: CPT | Performed by: INTERNAL MEDICINE

## 2019-09-09 PROCEDURE — 90750 HZV VACC RECOMBINANT IM: CPT | Performed by: INTERNAL MEDICINE

## 2019-09-09 PROCEDURE — 90715 TDAP VACCINE 7 YRS/> IM: CPT | Performed by: INTERNAL MEDICINE

## 2019-09-09 RX ORDER — ERGOCALCIFEROL 1.25 MG/1
50000 CAPSULE ORAL
Status: ON HOLD | COMMUNITY
End: 2019-12-10

## 2019-09-09 RX ORDER — DIMENHYDRINATE 50 MG
1 TABLET ORAL DAILY
COMMUNITY

## 2019-09-09 ASSESSMENT — MIFFLIN-ST. JEOR: SCORE: 1833.65

## 2019-09-09 NOTE — PROGRESS NOTES
SUBJECTIVE:   CC: Christopher Velazquez is an 64 year old male who presents for preventative health visit.     Healthy Habits:    Getting at least 3 servings of Calcium per day:  Yes    Bi-annual eye exam:  Yes    Dental care twice a year:  Yes    Sleep apnea or symptoms of sleep apnea:  None    Diet:  Regular (no restrictions)    Frequency of exercise:  4-5 days/week    Duration of exercise:  Greater than 60 minutes    Taking medications regularly:  Yes    Barriers to taking medications:  Not applicable    Medication side effects:  Not applicable    PHQ-2 Total Score:    Additional concerns today:  No          Today's PHQ-2 Score:   PHQ-2 Score:     PHQ-2 ( 1999 Pfizer) 9/9/2019   Q1: Little interest or pleasure in doing things 0   Q2: Feeling down, depressed or hopeless 0   PHQ-2 Score 0       Abuse: Current or Past(Physical, Sexual or Emotional)- No  Do you feel safe in your environment? Yes    Social History     Tobacco Use     Smoking status: Never Smoker     Smokeless tobacco: Never Used   Substance Use Topics     Alcohol use: Yes     Comment: wine on weekends- 5 drinks/wk     If you drink alcohol do you typically have >3 drinks per day or >7 drinks per week? No    Last PSA:   PSA   Date Value Ref Range Status   09/11/2019 14.20 (H) 0 - 4 ug/L Final     Comment:     Assay Method:  Chemiluminescence using Siemens Vista analyzer       Reviewed orders with patient. Reviewed health maintenance and updated orders accordingly - Yes      Reviewed and updated as needed this visit by clinical staff  Tobacco  Allergies  Meds  Problems  Med Hx  Surg Hx  Fam Hx  Soc Hx          Reviewed and updated as needed this visit by Provider  Tobacco  Allergies  Meds  Problems  Med Hx  Surg Hx  Fam Hx  Soc Hx           Past Medical History:  ---------------------------  Past Medical History:   Diagnosis Date     BCC (basal cell carcinoma of skin)     L ear       Past Surgical History:  ---------------------------  Past Surgical  History:   Procedure Laterality Date     ARTHROSCOPY SHOULDER RT/LT  1999    L     HC CLOSED TX CLAVICULAR FX W/O MANIPULATION  2003    R     HC REPAIR OF NASAL SEPTUM  1994    septoplasty, rhinoplasty       Current Medications:  ---------------------------  Current Outpatient Medications   Medication Sig Dispense Refill     ergocalciferol (ERGOCALCIFEROL) 75955 units capsule Take 50,000 Units by mouth       Flaxseed, Linseed, (FLAX SEED OIL) 1000 MG capsule Take 1 capsule by mouth daily       MULTI-VITAMIN OR TABS 1 TABLET DAILY       Omega-3 Fatty Acids (FISH OIL PO)          Allergies:  -------------  No Known Allergies    Social History:  -------------------  Social History     Socioeconomic History     Marital status:      Spouse name: Not on file     Number of children: 1     Years of education: Not on file     Highest education level: Not on file   Occupational History     Occupation: retired     Employer: [employer]   Social Needs     Financial resource strain: Not on file     Food insecurity:     Worry: Not on file     Inability: Not on file     Transportation needs:     Medical: Not on file     Non-medical: Not on file   Tobacco Use     Smoking status: Never Smoker     Smokeless tobacco: Never Used   Substance and Sexual Activity     Alcohol use: Yes     Comment: wine on weekends- 5 drinks/wk     Drug use: Never     Sexual activity: Yes   Lifestyle     Physical activity:     Days per week: Not on file     Minutes per session: Not on file     Stress: Not on file   Relationships     Social connections:     Talks on phone: Not on file     Gets together: Not on file     Attends Zoroastrianism service: Not on file     Active member of club or organization: Not on file     Attends meetings of clubs or organizations: Not on file     Relationship status: Not on file     Intimate partner violence:     Fear of current or ex partner: Not on file     Emotionally abused: Not on file     Physically abused: Not on  "file     Forced sexual activity: Not on file   Other Topics Concern     Parent/sibling w/ CABG, MI or angioplasty before 65F 55M? Not Asked   Social History Narrative     Not on file       Family Medical History:  ------------------------------  Family History   Problem Relation Age of Onset     Neurologic Disorder Mother         D:  ALS 75     Family History Negative Father         B; 1920's     Lung Cancer Sister 60        lung cancer (nonsmoker),      Pancreatic Cancer Brother 54     Valvular heart disease Brother 68        b. 1949     Family History Negative Brother      Family History Negative Brother      Family History Negative Sister      Family History Negative Sister      Colon Cancer No family hx of      Coronary Artery Disease Early Onset No family hx of         Review of Systems  CONSTITUTIONAL: NEGATIVE for fever, chills, change in weight  INTEGUMENTARY/SKIN: NEGATIVE for worrisome rashes, moles or lesions  EYES: NEGATIVE for vision changes or irritation  ENT: NEGATIVE for ear, mouth and throat problems  RESP: NEGATIVE for significant cough or SOB  CV: NEGATIVE for chest pain, palpitations or peripheral edema  GI: NEGATIVE for nausea, abdominal pain, heartburn, or change in bowel habits   male: negative for dysuria, hematuria, decreased urinary stream, erectile dysfunction, urethral discharge  MUSCULOSKELETAL: NEGATIVE for significant arthralgias or myalgia  NEURO: NEGATIVE for weakness, dizziness or paresthesias  PSYCHIATRIC: NEGATIVE for changes in mood or affect    OBJECTIVE:   BP (!) 142/98   Pulse 63   Temp 98.8  F (37.1  C) (Temporal)   Resp 16   Ht 1.803 m (5' 11\")   Wt 101.2 kg (223 lb)   SpO2 95%   BMI 31.10 kg/m      Physical Exam  GENERAL alert and no distress  EYES:  Normal sclera,conjunctiva, EOMI  HENT: oral and posterior pharynx without lesions or erythema, facies symmetric  NECK: Neck supple. No LAD, without thyroidmegaly.  RESP: Clear to ausculation bilaterally without wheezes " or crackles. Normal BS in all fields.  CV: RRR normal S1S2, 2/6 systolic murmur at upper left aternal border, without rubs or gallops.  LYMPH: no cervical lymph adenopathy appreciated  MS: extremities- no gross deformities of the visible extremities noted,   EXT:  no lower extremity edema  PSYCH: Alert and oriented times 3; speech- coherent  SKIN:  No obvious significant skin lesions on visible portions of face         ASSESSMENT/PLAN:     (Z00.00) Encounter for routine adult health examination without abnormal findings  (primary encounter diagnosis)  Comment: Discussed cardiac disease risk factor modification including screening for and treating HTN, lipids, DM, and smoking cessation.  Also discussed age appropriate cancer screening recommendations including testicular, prostate, colon and lung cancer as dictated by age group.  Recommended low fat, low salt diet and moderation in any alcohol intake.  Recommended always using seatbelts when in a car.  Recommended never driving after drinking or riding with someone who has been drinking as well.    Reviewed preventive health counseling, as reflected in patient instructions       Regular exercise       Healthy diet/nutrition       Vision screening       Hearing screening       Aspirin Prophylaxsis       Colon cancer screening       Prostate cancer screening   Plan:     (I35.0) Aortic valve stenosis, etiology of cardiac valve disease unspecified  Comment: Unexpected low murmur left upper sternal border.  Probably represents aortic stenosis.  Check echocardiogram to further evaluate.  Patient was somewhat nervous about this since his father had aortic valve disease requiring valve or placement.  He denies symptoms of chest pain shortness of breath.    Plan: Echocardiogram Complete            (R03.0) Elevated blood pressure reading without diagnosis of hypertension  Comment: The blood pressure was higher than expected today, which is not usual for the patient.  Discussed  the diagnostic criteria for HTN according to JNC VII.  Discussed the rationale for aggressive management of HTN including diagnosis, starting treatment when indicated, and importance of regular follow up to ensure adequate treatment.  Will have the patient check their BPs outside clinic and return for consideration for treatment if the BPs remain outside the normal range on regular basis.   Plan:     (Z13.6) CARDIOVASCULAR SCREENING; LDL GOAL LESS THAN 160  Comment: Discussed cardiac disease risk factors and cardiac disease risk factor modification.   Plan:     (Z23) Need for shingles vaccine  Comment:   Plan:     (Z23) Need for Tdap vaccination  Comment:   Plan:     (Z12.11) Screen for colon cancer  Comment: Discussed current colon cancer screening recommendations calling for colonoscopy as gold standard for colon cancer screening.  Instructed them to check with their insurance coverage to see what is covered, and then referral given for colonoscopy.    If colonoscopy not covered or the patient does not want to do this study, then we can do a FIT test annually, but I told them that this is a much inferior screening method for colon cancer.    Plan: GASTROENTEROLOGY ADULT REF PROCEDURE ONLY         Madhuri Troncoso [phone]; No Provider        Preference            (Z12.5) Screening for prostate cancer  Comment: Discussed prostate cancer screening and PSA blood test.  Discussed that an elevated PSA blood test can be caused by things other than prostate cancer, including infection/inflammation, BPH, and recent sexual activity; and that elevated PSA blood test may require further investigation with a urologist   Plan: PSA, screen, CANCELED: PSA, screen               COUNSELING:   Reviewed preventive health counseling, as reflected in patient instructions       Regular exercise       Healthy diet/nutrition       Vision screening       Hearing screening       Immunizations    Vaccinated for: SHingrix and TDAP   "           Aspirin Prophylaxsis       Colon cancer screening       Prostate cancer screening    Estimated body mass index is 31.1 kg/m  as calculated from the following:    Height as of this encounter: 1.803 m (5' 11\").    Weight as of this encounter: 101.2 kg (223 lb).          reports that he has never smoked. He has never used smokeless tobacco.    Counseling Resources:  ATP IV Guidelines  Pooled Cohorts Equation Calculator  FRAX Risk Assessment  ICSI Preventive Guidelines  Dietary Guidelines for Americans, 2010  USDA's MyPlate  ASA Prophylaxis  Lung CA Screening    Bhavesh Herrera MD  [facility]  "

## 2019-09-09 NOTE — PATIENT INSTRUCTIONS
"*  Echocardiogram to assess the mild heart murmur heard today, most likely you have mild aortic stenosis.     *  Colonscopy at your convenience. Eaganrachele Bob [phone] will contact you.     *  Shingles vaccine today.    *  Tetanus vaccine today.       * Your blood pressure is slightly higher than it should be.  I cannot technically call it hypertension yet, and need to have more readings to get a sense of what your readings truly are.     Check you blood pressure outside the clinic and accumulate 10-15 readings over the next 2 months and bring these to your appointment.  If the readings are above 140/90 on a regular basis, that would represent Hypertension and would indicate treatment.       Blood pressure goals:  ==============================================================    Normal: less than 130/80  High normal: 130-140/80-90  Hypertension:  greater than 140/90    Call if blood pressure frequently outside of this range, especially associated with side effects.           SHINGLES VACCINE:        I would recommend that you consider getting a \"shingles vaccine\".  The shingles vaccine does not stop you from getting shingles, but it decreases the intensity of the event, the duration of the event, and decreases the painful nerve condition that results     There are two options available for shingles vaccines:     --Shingrix: 2 shots, give 2-6 months apart  **recommended**   OR   --Zostavax, one shot       Based on the available data, the Shingrix vaccine has superior benefit and should be considered even if you have had the Zostavax vaccine before.         Contact your insurance provider and ask them if either shingles vaccine is covered and is so, how much it will cost you.  Usually this will be cheaper to get in a pharmacy given by the pharmacist.       Regardless of the coverage, I would recommend that you consider the vaccine since shingles is very painful, (just ask anyone " "who has ever had it).        BENIGN PROSTATIC HYPERTROPHY (ENLARGED PROSTATE):       *  Try the over the counter herbal supplement Saw Palmetto to see if this helps reduce prostate related symptoms.     * The decision for prescription medications for the prostate are totally based on the degree of symptoms, such as how many trips tot he bathroom at night, the degree of urgency to urinate, and the degree of hesitancy (difficulties in starting and stopping the stream of urine).      *  Minimizing prostate related symptoms include:       --Minimize intake of caffeine and alcohol (especially in the evenings)   --Avoid antihistames (Benadryl) because they can affect the bladder muscles.    --Careful fluid management close to bedtime, try to minimize fluid within 2-3 hours of sleep   --Do not ignore the urge to urinate, do not \"hold \" the urine as this can cause bladder injury    *  If you continue to have troublesome symptoms despite these conservative measures, then you may require a prescription medication.  Sometimes, even prescription medications are not enough and patients may require surgery.        COLONOSCOPY:     *  All patients over age 50 are recommended to have formal colon cancer screening (starting in the early 40's if there is a family history of colon cancer, 1 first degree relative or 2 second degree relatives)  *  I would recommend a colonoscopy at [facility]  for colon cancer.  They will contact you to arrange this at your convenience.    If you have not been contacted by them within a week, then you can contact them at [facility] ANDREA Bob [phone] .  *  Colonoscopy is the gold standard recommended procedure for colon cancer screening.    *  If the colonoscopy is normal and no family history of colon cancer, then repeat colonoscopy every 10 years.   *  Colonoscopy recommended every 5 years with any family history of colon cancer, regardless of the " "findings on your colonoscopy.  *  Colonoscopy may need to be done at shorter intervals if any pre-cancerous polyps are found.        Consider using the SuPrep colonoscopy prep to prepare the colon for the colonoscopy.  This requires much less liquid to be drunk to clean you out.  It may not be covered by insurance (approx $60-65), but it is much better tolerated and easier to use, therefore may be worth the extra expense.         5 GOALS TO PREVENT VASCULAR DISEASE:     1.  Aggressive blood pressure control, under 130/80 ideally.  Using medications if needed.    Your blood pressure is under good control    BP Readings from Last 4 Encounters:   09/09/19 (!) 142/98   08/29/19 (!) 153/97   05/30/19 138/88   08/26/12 140/105       2.  Aggressive LDL cholesterol (\"bad cholesterol\") lowering as indicated.    Your goal is an LDL under 130 for sure, preferably under 100.  (If you have diabetes or previous vascular disease, the the LDL goals would be under 100 for sure, preferably under 70.)    New guidelines identify four high-risk groups who could benefit from statins:   *people with pre-existing heart disease, such as those who have had a heart attack;   *people ages 40 to 75 who have diabetes of any type  *patients ages 40 to 75 with at least a 7.5% risk of developing cardiovascular disease over the next decade, according to a formula described in the guidelines  *patients with the sort of super-high cholesterol that sometimes runs in families, as evidenced by an LDL of 190 milligrams per deciliter or higher    Checking the cholesterol levels.      3.  Aggressive diabetic prevention, screening and/or management.      You do not have diabetes as of the most recent blood tests.     4.  No smoking    5.  Consider Daily aspirin: Have a discussion about the relative benefits and risks of taking daily low dose aspirin (81 mg) tablet once per day over the age of 50, unless there is a specific reason that you cannot take aspirin " "(such as side effect, allergy, or you are on another \"blood thinner\").        --Based on your current cardiac risk factors, you should take regular daily Aspirin 81 mg once per day, unless you have any reasons (side effects, intolerance, etc.) that you cannot take aspirin.               Preventive Health Recommendations  Male Ages 50 - 64    Yearly exam:             See your health care provider every year in order to  o   Review health changes.   o   Discuss preventive care.    o   Review your medicines if your doctor has prescribed any.     Have a cholesterol test every 5 years, or more frequently if you are at risk for high cholesterol/heart disease.     Have a diabetes test (fasting glucose) every three years. If you are at risk for diabetes, you should have this test more often.     Have a colonoscopy at age 50, or have a yearly FIT test (stool test). These exams will check for colon cancer.      Talk with your health care provider about whether or not a prostate cancer screening test (PSA) is right for you.    You should be tested each year for STDs (sexually transmitted diseases), if you re at risk.     Shots: Get a flu shot each year. Get a tetanus shot every 10 years.     Nutrition:    Eat at least 5 servings of fruits and vegetables daily.     Eat whole-grain bread, whole-wheat pasta and brown rice instead of white grains and rice.     Talk to your provider about Calcium and Vitamin D.        --Good Grains:  Oats, brown rice, Quinoa (these do not raise the blood sugar as much)     --Bad grains:  Anything made from wheat or white rice     (because these raise the blood sugars significantly, and the possible gluten issue from wheat for some people).      --Proteins:  Aim for \"lean proteins\" including chicken, fish, seafood, pork, turkey, and eggs (in moderation); Eat red meat only occasionally        Yahir Elizabeth:                    Lifestyle    Exercise for at least 150 minutes a week (30 minutes a day, 5 " "days a week). This will help you control your weight and prevent disease.     Limit alcohol to one drink per day.     No smoking.     Wear sunscreen to prevent skin cancer.     See your dentist every six months for an exam and cleaning.     See your eye doctor every 1 to 2 years.        *  OBESITY/WEIGHT LOSS:  ====================================================    *  Obesity is a major problem in this country.  Over 2/3 of adult Americans are overweight (BMI Over 25), and 1/3 are obese (BMI over 30)    *  Obesity is the leading cause of diabetes type II, which currently affects 1 out of 10 adult Americans and is projected to potentially affect 1 out of 3 adult Americans by 2040    *  Chronic obesity leads to \"insulin resistance\" which affects the carbohydrate (sugar) metabolism causing \"diabetisy\" which leads to type II Diabetes, increased visceral fat, a chronic low grade inflammatory state that leads to higher rates of vascular disease among other things.     *  Obesity is defined as BMI (body mass index) greater than 30.    *  Morbid obesity is defined as BMI over 40    Your most recent Body mass index is:  Body mass index is 31.1 kg/m .    The main principles in weight loss are diet and exercise. You need to have both.      + Be physically active. Do activities that you enjoy, give you energy and are safe for you to do.Gradually build up the intensity (how hard your body is working) of activity. Long-term, aim for 10,000 steps OR 30 minutes or more of activity most days of the week.     + Eat real (not processed) food. Eat mostly vegetables, fruit, whole grains and lean proteins. That way, you--not food manufacturers--control the ingredients that go into your meals.    +  Never eat a single food item with more than 6 ingredients listed on the label.    +  Keep your food shopping to the outside of the grocery store, do not eat foods that come from a bag or box, do not eat foods with bar codes.    + Aim for 5 " "servings of fruits and vegetables a day. Choose a variety of vegetables with different colors. Have fresh fruit for dessert. Limit  deep-fried vegetables, such as french fries.    + Choose lean protein, such as chicken or fish. Try non-meat sources of protein such as beans, soy and other legumes.    + Choose whole grains. Whole grain foods, such as whole-wheat bread, brown rice, barley, quinoa and oatmeal, contain the entire grain kernel and are better for your health. Limit refined grains, such as white bread and rice.    + Satisfy hunger with unsaturated fat. Fat helps you feel satisfied. Choose unsaturated fats, such as canola or olive oils, nuts and seeds, oil-based dressings and avocados. Limit saturated fats, which are found in animal products and some plant oils, such as coconut and palm oils.    + Pay attention to portion sizes. Use smaller plates, bowls and glasses. Portion out foods before you eat.    +  Use the \"fork rule\" for all eating. [If you can't pick food up with a fork, then the food will not turn off hunger very well. Using this rule helps patients avoid choosing the wrong types of food, especially if you have had a bariatric surgery operation.   The \"wrong foods\" include liquid calories such as soup, juice, soda, slimfast, ice cream, and beer, crumbly foods such as chips, crackers, and cookies, and starch based foods such as pasta, too much rice, and too much bread.]     + Drink water or unsweetened beverages. Avoid soda, sweetened coffees and teas, energy drinks and sports drinks, which are full of added sugar that your body does not need. Water is always the best option.    +  Drink one large glass of water BEFORE each meal.  This not only helps guard against dehydration, but it also helps provide additional \"fullness\" that will help reduce the amount of food that you will consume in a given meal by helping you fill up earlier.      + Eat mindfully. Take time to fully enjoy your food and pay " "attention to what you are eating. Make meals last 15 to 30 minutes. This gives your body a chance to become satisfied and tell your brain to stop eating. Pay attention to what you are eating, rather than doing other activities such as watching TV or driving. This helps you pay attention to your body s signals of hunger and fullness.    + Share meals when eating at restaurants, or put half of the entrée in a to-go container before you start eating.    +  Figure out what kind of calories you are taking in now at this time.  It is hard to change behaviors that you do not understand.      +  Keep your calorie intake at least less than 1500 per day to start (under 1300 total if you want to be more aggressive), divided between 3 meals (try to have no meal more than 500 calories) and 2 snacks.      +  \"Learn what 500 calories looks like\" and try to keep all meals under 500 calories.      +  Try to eat breakfast every day.  Skipping meals has been shown to be one of the factors that correlates the highest with obesity, (even more than fast food).  Try to avoid the typical carbohydrates and sugars that dominate the typical American breakfast.  Try to get more protein in earlier in the day, this will make you less hungry later.       +  Try High Protein Ensure or Boost nutritional supplements (or similar versions) for breakfast if nothing else.      +  Avoid \"manufactured foods\" as much as possible.  My definition of a \"manufactured\" or \"processed\" food is any single food product that has more than 6 ingredients.     +  Try to eat three smaller meals a day every day:  breakfast, lunch, and supper.    +  I NEVER recommend over the counter diet aids such as Metabolife or Dexatrim since they have a high risk of side effects mainly fast heart rate, insomnia, and nervousness.    +  It is very hard to lose weight with diet changes alone.  You need to have regular exercise.  You should aim for either 3 hours per week total of " "cumulative physical aerobic activity or 10,000 steps per day of activity.  Buy a pedometer and keep track of your activity.  If your typical daily activity does not add up to 10,000 total steps, then make up the difference with walking or some sort of aerobic activity.          Good resources for nutrition are:         --Good Grains:  Oats, brown rice, Quinoa (these do not raise the blood sugar as much)     --Bad grains:  Anything made from wheat or white rice     (because these raise the blood sugars significantly, and the possible gluten issue from wheat for some people).      --Proteins:  Aim for \"lean proteins\" including chicken, fish, seafood, pork, turkey, and eggs (in moderation); Eat red meat only occasionally      ---> \"Wheat Belly\" by Ata Lopez  (a book about the many bad things processed wheat products (i.e. Bread) have caused in our country...which I believe has serious merit)       --->  \"The Paleo Diet\"  By Karon Duvall.             --->\"In Defense of Food\"  Of \"Food Rules\" (Carson Elizabeth) a book that goes into the causes obesity epidemic in our country    Yahir Elizabeth:                    ---> \"Picture Perfect Weight Loss\" by Heber Pressley (another good book about choices)        ---> \"Eat This, Not That\" Series,  by Isaías Kitchen  (a book about food choices in the modern world)              ---> \"The Blood Sugar Solution\" by Christopher Remy ( a book about obesity and insulin resistance leading to \"diabesity\")           Nereida Lopes ( a guidebook for counting calories, and knowing the components of the food that you eat)       \"The Best Life Diet\"  (a complete diet program)             Aim for a Healthy Weight Web Page at www.nhlbi.nih.gov       [place] Diet       Jaylensara Regalado:  \"Eat More, Weigh Less\" or \"The Program for the Reversal of Heart Disease\"       [facility] web site  the food and nutrition link.       American Heart Association       American Diabetes Association " (www.diabetes.org)

## 2019-09-11 DIAGNOSIS — Z12.5 SCREENING FOR PROSTATE CANCER: ICD-10-CM

## 2019-09-11 DIAGNOSIS — Z13.6 CARDIOVASCULAR SCREENING; LDL GOAL LESS THAN 160: Primary | ICD-10-CM

## 2019-09-11 LAB — PSA SERPL-ACNC: 14.2 UG/L (ref 0–4)

## 2019-09-11 PROCEDURE — 36415 COLL VENOUS BLD VENIPUNCTURE: CPT | Performed by: INTERNAL MEDICINE

## 2019-09-11 PROCEDURE — G0103 PSA SCREENING: HCPCS | Performed by: INTERNAL MEDICINE

## 2019-09-11 PROCEDURE — 80061 LIPID PANEL: CPT | Performed by: INTERNAL MEDICINE

## 2019-09-11 PROCEDURE — 80048 BASIC METABOLIC PNL TOTAL CA: CPT | Performed by: INTERNAL MEDICINE

## 2019-09-12 ENCOUNTER — TELEPHONE (OUTPATIENT)
Dept: INTERNAL MEDICINE | Facility: CLINIC | Age: 64
End: 2019-09-12

## 2019-09-12 DIAGNOSIS — R97.20 ELEVATED PROSTATE SPECIFIC ANTIGEN (PSA): Primary | ICD-10-CM

## 2019-09-12 LAB
ANION GAP SERPL CALCULATED.3IONS-SCNC: 6 MMOL/L (ref 3–14)
BUN SERPL-MCNC: 21 MG/DL (ref 7–30)
CALCIUM SERPL-MCNC: 9.4 MG/DL (ref 8.5–10.1)
CHLORIDE SERPL-SCNC: 107 MMOL/L (ref 94–109)
CHOLEST SERPL-MCNC: 186 MG/DL
CO2 SERPL-SCNC: 27 MMOL/L (ref 20–32)
CREAT SERPL-MCNC: 0.98 MG/DL (ref 0.66–1.25)
GFR SERPL CREATININE-BSD FRML MDRD: 82 ML/MIN/{1.73_M2}
GLUCOSE SERPL-MCNC: 101 MG/DL (ref 70–99)
HDLC SERPL-MCNC: 44 MG/DL
LDLC SERPL CALC-MCNC: 122 MG/DL
NONHDLC SERPL-MCNC: 142 MG/DL
POTASSIUM SERPL-SCNC: 4.7 MMOL/L (ref 3.4–5.3)
SODIUM SERPL-SCNC: 140 MMOL/L (ref 133–144)
TRIGL SERPL-MCNC: 98 MG/DL

## 2019-09-23 ENCOUNTER — HOSPITAL ENCOUNTER (OUTPATIENT)
Dept: CARDIOLOGY | Facility: CLINIC | Age: 64
Discharge: HOME | End: 2019-09-23
Attending: INTERNAL MEDICINE | Admitting: INTERNAL MEDICINE
Payer: COMMERCIAL

## 2019-09-23 ENCOUNTER — TELEPHONE (OUTPATIENT)
Dept: INTERNAL MEDICINE | Facility: CLINIC | Age: 64
End: 2019-09-23

## 2019-09-23 DIAGNOSIS — I35.0 AORTIC VALVE STENOSIS, ETIOLOGY OF CARDIAC VALVE DISEASE UNSPECIFIED: ICD-10-CM

## 2019-09-23 PROCEDURE — 25500064 ZZH RX 255 OP 636: Performed by: INTERNAL MEDICINE

## 2019-09-23 PROCEDURE — 93306 TTE W/DOPPLER COMPLETE: CPT | Mod: 26 | Performed by: INTERNAL MEDICINE

## 2019-09-23 PROCEDURE — 40000264 ECHOCARDIOGRAM COMPLETE

## 2019-09-23 RX ADMIN — HUMAN ALBUMIN MICROSPHERES AND PERFLUTREN 9 ML: 10; .22 INJECTION, SOLUTION INTRAVENOUS at 09:30

## 2019-09-23 NOTE — TELEPHONE ENCOUNTER
Please contact the patient.  His echocardiogram (heart ultrasound) did not show any significant abnormalities other than minor regurgitation (backflow) in the aortic valve.  This would account for the murmur that I heard durign his exam.    I performed the echocardiogram to make sure there is nothing more significant present and fortunately there was nothing serious found.  Otherwise the heart performance is normal.  There is no further indication for any further testing or any specific management.

## 2019-09-25 ENCOUNTER — TELEPHONE (OUTPATIENT)
Dept: INTERNAL MEDICINE | Facility: CLINIC | Age: 64
End: 2019-09-25

## 2019-09-25 NOTE — TELEPHONE ENCOUNTER
Dr. Javier TARANGO, please see message below and close encounter if no further action needed.    Thanks!  SATINDER Walters

## 2019-09-25 NOTE — TELEPHONE ENCOUNTER
Reason for Call:  Other appointment    Detailed comments: Per patient, he got a letter to make urologist appointment and he has appointment with urologist tomorrow. Any question, please call him.    Phone Number Patient can be reached at: Home number on file [phone] (home)    Best Time: Anytime    Can we leave a detailed message on this number? YES    Call taken on 9/25/2019 at 1:47 PM by LUI KIRK

## 2019-09-26 ENCOUNTER — TRANSFERRED RECORDS (OUTPATIENT)
Dept: HEALTH INFORMATION MANAGEMENT | Facility: CLINIC | Age: 64
End: 2019-09-26

## 2019-10-09 ENCOUNTER — TRANSFERRED RECORDS (OUTPATIENT)
Dept: HEALTH INFORMATION MANAGEMENT | Facility: CLINIC | Age: 64
End: 2019-10-09

## 2019-10-23 ENCOUNTER — TRANSFERRED RECORDS (OUTPATIENT)
Dept: HEALTH INFORMATION MANAGEMENT | Facility: CLINIC | Age: 64
End: 2019-10-23

## 2019-11-06 ENCOUNTER — HOSPITAL ENCOUNTER (OUTPATIENT)
Facility: CLINIC | Age: 64
Discharge: HOME | End: 2019-11-06
Attending: COLON & RECTAL SURGERY | Admitting: COLON & RECTAL SURGERY
Payer: COMMERCIAL

## 2019-11-06 VITALS
HEIGHT: 69 IN | RESPIRATION RATE: 11 BRPM | BODY MASS INDEX: 29.62 KG/M2 | WEIGHT: 200 LBS | HEART RATE: 57 BPM | DIASTOLIC BLOOD PRESSURE: 99 MMHG | OXYGEN SATURATION: 93 % | SYSTOLIC BLOOD PRESSURE: 129 MMHG

## 2019-11-06 LAB — COLONOSCOPY: NORMAL

## 2019-11-06 PROCEDURE — 88305 TISSUE EXAM BY PATHOLOGIST: CPT | Performed by: COLON & RECTAL SURGERY

## 2019-11-06 PROCEDURE — 25000128 H RX IP 250 OP 636: Performed by: COLON & RECTAL SURGERY

## 2019-11-06 PROCEDURE — G0500 MOD SEDAT ENDO SERVICE >5YRS: HCPCS | Performed by: COLON & RECTAL SURGERY

## 2019-11-06 PROCEDURE — 45385 COLONOSCOPY W/LESION REMOVAL: CPT | Performed by: COLON & RECTAL SURGERY

## 2019-11-06 PROCEDURE — 88305 TISSUE EXAM BY PATHOLOGIST: CPT | Mod: 26,76 | Performed by: COLON & RECTAL SURGERY

## 2019-11-06 RX ORDER — FENTANYL CITRATE 50 UG/ML
INJECTION, SOLUTION INTRAMUSCULAR; INTRAVENOUS PRN
Status: DISCONTINUED | OUTPATIENT
Start: 2019-11-06 | End: 2019-11-06 | Stop reason: HOSPADM

## 2019-11-06 RX ORDER — ONDANSETRON 2 MG/ML
4 INJECTION INTRAMUSCULAR; INTRAVENOUS
Status: DISCONTINUED | OUTPATIENT
Start: 2019-11-06 | End: 2019-11-06 | Stop reason: HOSPADM

## 2019-11-06 RX ORDER — LIDOCAINE 40 MG/G
CREAM TOPICAL
Status: DISCONTINUED | OUTPATIENT
Start: 2019-11-06 | End: 2019-11-06 | Stop reason: HOSPADM

## 2019-11-06 ASSESSMENT — MIFFLIN-ST. JEOR: SCORE: 1697.57

## 2019-11-06 NOTE — H&P
Colon & Rectal Surgery History and Physical  Pre-Endoscopy Procedure Note    History of Present Illness   I have been asked by Dr. Christopher Velazquez to evaluate this 64 year old male for colorectal cancer screening. He currently denies any abdominal pain, weight loss, bleeding per rectum, or recent change in bowel habits.    Past Medical History  Diagnosis Date     BCC (basal cell carcinoma of skin)     L ear     Prostate cancer (H)        Past Surgical History  Procedure Laterality Date     ARTHROSCOPY SHOULDER RT/LT  1999    left     CLOSED TX CLAVICULAR FX W/O MANIPULATION  2003    right     REPAIR OF NASAL SEPTUM  1994    septoplasty, rhinoplasty        Medications  Medication Sig     ergocalciferol (ERGOCALCIFEROL) 30666 units capsule Take 50,000 Units by mouth     Flaxseed, Linseed, (FLAX SEED OIL) 1000 MG capsule Take 1 capsule by mouth daily     MULTI-VITAMIN OR TABS 1 TABLET DAILY     Omega-3 Fatty Acids (FISH OIL PO)        Allergies   No Known Allergies     Family History   Family history includes Lung Cancer (age of onset: 60) in his sister; Neurologic Disorder in his mother; Pancreatic Cancer (age of onset: 54) in his brother; Valvular heart disease (age of onset: 68) in his brother.     Social History   He reports that he has never smoked. He has never used smokeless tobacco. He reports current alcohol use. He reports that he does not use drugs.    Review of Systems   Constitutional:  No fever, weight change or fatigue.    Eyes:     No dry eyes or vision changes.   Ears/Nose/Throat/Neck:  No oral ulcers, sore throat or voice change.    Cardiovascular:   No palpitations, syncope, angina or edema.   Respiratory:    No chest pain, excessive sleepiness, shortness of breath or hemoptysis.    Gastrointestinal:   No abdominal pain, nausea, vomiting, diarrhea or heartburn.    Genitourinary:   No dysuria, hematuria, urinary retention or urinary frequency.   Musculoskeletal:  No joint swelling or arthralgias.  "   Dermatologic:  No skin rash or other skin changes.   Neurologic:    No focal weakness or numbness. No neuropathy.   Psychiatric:    No depression, anxiety, suicidal ideation, or paranoid ideation.   Endocrine:   No cold or heat intolerance, polydipsia, hirsutism, change in libido, or flushing.   Hematology/Lymphatic:  No bleeding or lymphadenopathy.    Allergy/Immunology:  No rhinitis or hives.     Physical Exam   Vitals:  BP (!) 152/93, RR 16, HR 64, height 1.753 m (5' 9\"), weight 90.7 kg (200 lb), SpO2 99 %.    General:  Alert and oriented to person, place and time   Airway: Normal oropharyngeal airway and neck mobility   Lungs:  Clear bilaterally   Heart:  Regular rate and rhythm   Abdomen: Soft, NT, ND, no masses   Rectal:  Perianal skin without excoriation, hemorrhoidal disease or anal fissure        Digital rectal examination reveals normal sphincter tone without masses    ASA Grade: II (mild systemic disease)    Impression: Cleared for use of conscious sedation for colorectal cancer screening    Plan: Proceed with colonoscopy     More Schwartz MD, MD  [facility]  [phone]  "

## 2019-11-07 LAB — COPATH REPORT: NORMAL

## 2019-12-04 ENCOUNTER — OFFICE VISIT (OUTPATIENT)
Dept: INTERNAL MEDICINE | Facility: CLINIC | Age: 64
End: 2019-12-04
Payer: COMMERCIAL

## 2019-12-04 VITALS
TEMPERATURE: 97.6 F | WEIGHT: 218.1 LBS | DIASTOLIC BLOOD PRESSURE: 86 MMHG | HEIGHT: 69 IN | OXYGEN SATURATION: 96 % | HEART RATE: 70 BPM | SYSTOLIC BLOOD PRESSURE: 132 MMHG | BODY MASS INDEX: 32.3 KG/M2

## 2019-12-04 DIAGNOSIS — Z01.818 PREOP GENERAL PHYSICAL EXAM: Primary | ICD-10-CM

## 2019-12-04 DIAGNOSIS — C61 PROSTATE CANCER (H): ICD-10-CM

## 2019-12-04 LAB
ANION GAP SERPL CALCULATED.3IONS-SCNC: 7 MMOL/L (ref 3–14)
BUN SERPL-MCNC: 23 MG/DL (ref 7–30)
CALCIUM SERPL-MCNC: 10.1 MG/DL (ref 8.5–10.1)
CHLORIDE SERPL-SCNC: 106 MMOL/L (ref 94–109)
CO2 SERPL-SCNC: 27 MMOL/L (ref 20–32)
CREAT SERPL-MCNC: 0.85 MG/DL (ref 0.66–1.25)
ERYTHROCYTE [DISTWIDTH] IN BLOOD BY AUTOMATED COUNT: 12.6 % (ref 10–15)
GFR SERPL CREATININE-BSD FRML MDRD: >90 ML/MIN/{1.73_M2}
GLUCOSE SERPL-MCNC: 111 MG/DL (ref 70–99)
HCT VFR BLD AUTO: 47.5 % (ref 40–53)
HGB BLD-MCNC: 16 G/DL (ref 13.3–17.7)
MCH RBC QN AUTO: 31.4 PG (ref 26.5–33)
MCHC RBC AUTO-ENTMCNC: 33.7 G/DL (ref 31.5–36.5)
MCV RBC AUTO: 93 FL (ref 78–100)
PLATELET # BLD AUTO: 186 10E9/L (ref 150–450)
POTASSIUM SERPL-SCNC: 5.1 MMOL/L (ref 3.4–5.3)
RBC # BLD AUTO: 5.1 10E12/L (ref 4.4–5.9)
SODIUM SERPL-SCNC: 140 MMOL/L (ref 133–144)
WBC # BLD AUTO: 5.2 10E9/L (ref 4–11)

## 2019-12-04 PROCEDURE — 99214 OFFICE O/P EST MOD 30 MIN: CPT | Performed by: INTERNAL MEDICINE

## 2019-12-04 PROCEDURE — 36415 COLL VENOUS BLD VENIPUNCTURE: CPT | Performed by: INTERNAL MEDICINE

## 2019-12-04 PROCEDURE — 80048 BASIC METABOLIC PNL TOTAL CA: CPT | Performed by: INTERNAL MEDICINE

## 2019-12-04 PROCEDURE — 85027 COMPLETE CBC AUTOMATED: CPT | Performed by: INTERNAL MEDICINE

## 2019-12-04 PROCEDURE — 93000 ELECTROCARDIOGRAM COMPLETE: CPT | Performed by: INTERNAL MEDICINE

## 2019-12-04 ASSESSMENT — MIFFLIN-ST. JEOR: SCORE: 1779.68

## 2019-12-04 NOTE — LETTER
12/4/2019      Christopher Velazquez  [address] [address]        Dear Marko Christopher Velazquez:    I am writing to inform you of the results of the laboratory tests you had done recently.    Kidney function: NORMAL  Hemoglobin: NORMAL  Electrolytes: NORMAL  Glucose: NORMAL    Your labs look good.  You are OK to go for your surgery.      Thank you for allowing me to participate in your care. If you have any further questions or problems, please contact me via our nurse line at [phone]    Sincerely,          Bhavesh Herrera M.D.  Department of Internal Medicine  [facility]

## 2019-12-04 NOTE — PROGRESS NOTES
[address] [address]  [phone]  Dept: [phone]    PRE-OP EVALUATION:  Today's date: 2019    Christopher Velazquez (: 1955) presents for pre-operative evaluation assessment as requested by Dr. Andrew Tobar.  He requires evaluation and anesthesia risk assessment prior to undergoing surgery/procedure for treatment of prostate cancer.    Proposed Surgery/ Procedure: ROBOTIC ASSISTED RADICAL PROSTATECTOMY  Date of Surgery/ Procedure: 12/10/2019  Time of Surgery/ Procedure: 12:30PM  Hospital/Surgical Facility: [facility]   Primary Physician: Bhavesh Herrera  Type of Anesthesia Anticipated: to be determined    Patient has a Health Care Directive or Living Will:  NO    1. NO - Do you have a history of heart attack, stroke, stent, bypass or surgery on an artery in the head, neck, heart or legs?  2. NO - Do you ever have any pain or discomfort in your chest?  3. NO - Do you have a history of  Heart Failure?  4. NO - Are you troubled by shortness of breath when: walking on the level, up a slight hill or at night?  5. NO - Do you currently have a cold, bronchitis or other respiratory infection?  6. NO - Do you have a cough, shortness of breath or wheezing?  7. NO - Do you sometimes get pains in the calves of your legs when you walk?  8. NO - Do you or anyone in your family have previous history of blood clots?  9. NO - Do you or does anyone in your family have a serious bleeding problem such as prolonged bleeding following surgeries or cuts?  10. NO - Have you ever had problems with anemia or been told to take iron pills?  11. NO - Have you had any abnormal blood loss such as black, tarry or bloody stools, or abnormal vaginal bleeding?  12. NO - Have you ever had a blood transfusion?  13. NO - Have you or any of your relatives ever had problems with anesthesia?  14. NO - Do you have sleep apnea, excessive snoring or daytime  drowsiness?  15. NO - Do you have any prosthetic heart valves?  16. NO - Do you have prosthetic joints?  17. NO - Is there any chance that you may be pregnant?      HPI:     HPI related to upcoming procedure: prostate cancer      *  No recent infectious illnesses.    *  No recent cardiac or pulmonary issues or symptoms.    *  No problems performing vigorous physical activity, no changes in exercise tolerance.    *  No personal or family history of anesthesia complications.    *  No personal or family history of bleeding or clotting disorders.         MEDICAL HISTORY:     Patient Active Problem List    Diagnosis Date Noted     Prostate cancer (H) 10/01/2019     Priority: Medium     prostate cancer, [place] 4+4       CARDIOVASCULAR SCREENING; LDL GOAL LESS THAN 160 10/31/2010     Priority: Medium      Past Medical History:   Diagnosis Date     BCC (basal cell carcinoma of skin)     L ear     Prostate cancer (H) 10/01/2019    prostate cancer, [place] 4+4     Past Surgical History:   Procedure Laterality Date     ARTHROSCOPY SHOULDER RT/LT  1999    L     HC CLOSED TX CLAVICULAR FX W/O MANIPULATION  2003    R     HC REPAIR OF NASAL SEPTUM  1994    septoplasty, rhinoplasty     Current Outpatient Medications   Medication Sig Dispense Refill     ergocalciferol (ERGOCALCIFEROL) 25962 units capsule Take 50,000 Units by mouth       Flaxseed, Linseed, (FLAX SEED OIL) 1000 MG capsule Take 1 capsule by mouth daily       MULTI-VITAMIN OR TABS 1 TABLET DAILY       Omega-3 Fatty Acids (FISH OIL PO)        OTC products: None, except as noted above and no recent use of OTC ASA, NSAIDS or Steroids    No Known Allergies   Latex Allergy: NO    Social History     Tobacco Use     Smoking status: Never Smoker     Smokeless tobacco: Never Used   Substance Use Topics     Alcohol use: Yes     Comment: wine on weekends- 5 drinks/wk     History   Drug Use Unknown       REVIEW OF SYSTEMS:   CONSTITUTIONAL: NEGATIVE for fever, chills, change in  "weight  INTEGUMENTARY/SKIN: NEGATIVE for worrisome rashes, moles or lesions  EYES: NEGATIVE for vision changes or irritation  ENT/MOUTH: NEGATIVE for ear, mouth and throat problems  RESP: NEGATIVE for significant cough or SOB  BREAST: NEGATIVE for masses, tenderness or discharge  CV: NEGATIVE for chest pain, palpitations or peripheral edema  GI: NEGATIVE for nausea, abdominal pain, heartburn, or change in bowel habits  : NEGATIVE for frequency, dysuria, or hematuria  MUSCULOSKELETAL: NEGATIVE for significant arthralgias or myalgia  NEURO: NEGATIVE for weakness, dizziness or paresthesias  ENDOCRINE: NEGATIVE for temperature intolerance, skin/hair changes  HEME: NEGATIVE for bleeding problems  PSYCHIATRIC: NEGATIVE for changes in mood or affect    EXAM:   /86   Pulse 70   Temp 97.6  F (36.4  C) (Temporal)   Ht 1.753 m (5' 9\")   Wt 98.9 kg (218 lb 1.6 oz)   SpO2 96%   BMI 32.21 kg/m      GENERAL alert and no distress  EYES:  Normal sclera,conjunctiva, EOMI  HENT: oral and posterior pharynx without lesions or erythema, facies symmetric  NECK: Neck supple. No LAD, without thyroidmegaly.  RESP: Clear to ausculation bilaterally without wheezes or crackles. Normal BS in all fields.  CV: RRR normal S1S2 without murmurs, rubs or gallops.  LYMPH: no cervical lymph adenopathy appreciated  MS: extremities- no gross deformities of the visible extremities noted,   EXT:  no lower extremity edema  PSYCH: Alert and oriented times 3; speech- coherent  SKIN:  No obvious significant skin lesions on visible portions of face     DIAGNOSTICS:   EKG: appears normal, NSR, normal axis, normal intervals, no acute ST/T changes c/w ischemia, no LVH by voltage criteria, Right Bundle Branch Block, unchanged from previous tracings    Recent Labs   Lab Test 09/11/19  0809      POTASSIUM 4.7   CR 0.98      Results for orders placed or performed in visit on 12/04/19   CBC with platelets     Status: None   Result Value Ref Range    " WBC 5.2 4.0 - 11.0 10e9/L    RBC Count 5.10 4.4 - 5.9 10e12/L    Hemoglobin 16.0 13.3 - 17.7 g/dL    Hematocrit 47.5 40.0 - 53.0 %    MCV 93 78 - 100 fl    MCH 31.4 26.5 - 33.0 pg    MCHC 33.7 31.5 - 36.5 g/dL    RDW 12.6 10.0 - 15.0 %    Platelet Count 186 150 - 450 10e9/L   Basic metabolic panel     Status: Abnormal   Result Value Ref Range    Sodium 140 133 - 144 mmol/L    Potassium 5.1 3.4 - 5.3 mmol/L    Chloride 106 94 - 109 mmol/L    Carbon Dioxide 27 20 - 32 mmol/L    Anion Gap 7 3 - 14 mmol/L    Glucose 111 (H)  **nonfasting** 70 - 99 mg/dL    Urea Nitrogen 23 7 - 30 mg/dL    Creatinine 0.85 0.66 - 1.25 mg/dL    GFR Estimate >90 >60 mL/min/[1.73_m2]    GFR Estimate If Black >90 >60 mL/min/[1.73_m2]    Calcium 10.1 8.5 - 10.1 mg/dL      IMPRESSION:   Reason for surgery/procedure: Prostate cancer    Diagnosis/reason for consult:     1. Preop general physical exam    2. Prostate cancer (H)         The proposed surgical procedure is considered INTERMEDIATE risk.    REVISED CARDIAC RISK INDEX  The patient has the following serious cardiovascular risks for perioperative complications such as (MI, PE, VFib and 3  AV Block):  No serious cardiac risks  INTERPRETATION: 0 risks: Class I (very low risk - 0.4% complication rate)    The patient has the following additional risks for perioperative complications:  No identified additional risks      ICD-10-CM    1. Preop general physical exam Z01.818 EKG 12-lead complete w/read - Clinics     CBC with platelets     Basic metabolic panel   2. Prostate cancer (H) C61        RECOMMENDATIONS:       Cardiovascular Risk  Performs 4 METs exercise without symptoms (Light housework (dusting, washing dishes), Climb a flight of stairs and Walk on level ground at 15 minutes per mile (4 miles/hour)) .       Pulmonary Risk  No specific pulmonary risk factors identified       --Patient is to take all scheduled medications on the day of surgery EXCEPT for modifications listed  below.    Anticoagulant or Antiplatelet Medication Use  No ASA or NSAIDs within 7 days of surgery         APPROVAL GIVEN to proceed with proposed procedure, without further diagnostic evaluation       Signed Electronically by: Bhavesh Herrera MD    Copy of this evaluation report is provided to requesting physician.    [facility] Preop Guidelines    Revised Cardiac Risk Index

## 2019-12-04 NOTE — PATIENT INSTRUCTIONS
PRE-OPERATIVE INSTRUCTIONS:     *  Contact your surgeon if there is any change in your health. This includes signs of fection, such as cold or flu (such as a sore throat, runny nose, cough, rash or fever).    *  Stop aspirin of any kind for 7 days before procedure.   (even low dose daily aspirin).    *  No NSAIDs (Motrin, Advil, ibuprofen, Aleve, etc) within 5-7 days of surgery.    *  Tylenol (acetaminophen) OK to take if needed for pain or headache.  Follow instructions on the bottle    *  Stop any Fish Oil or vitamin E supplements for 7 days prior to surgery because these can affect platelet function.      *  Prepare your body as instructed by the surgery clinic.  If instructed, Take a shower or bath the night before surgery. Use any special soaps or cleaning instructions according to instructions from your surgeon.  If you do not have soap from your surgeon, use your regular soap. Do not shave or scrub the surgery site.  Wear clean pajamas and have clean sheets on your bed     *  ON THE MORNING OF SURGERY:     --Do not take any medications     *  Resume all medications at the same doses after surgery, unless instructed otherwise by the medical staff.     *  Attend all follow up appointments with the surgeons (and/or therapists if applicable) as instructed.     *  Contact the surgeon's office for any specific questions about after-surgery cares and follow up instructions.        IF YOU REQUIRE NARCOTIC PAIN MEDICATION AFTER YOUR SURGERY:    --Take the narcotic pain medication exactly as prescribed, and use the absolute lowest dose needed for pain control.   The goal of pain medication is not complete pain relief, aim to just make it manageable.    --Beware of drowsiness, nausea, vomiting, when taking this medication.  Do not drive, or operate dangerous equipment after taking this.    --The main side effects from narcotic pain medication can also include intestinal side effects including nausea, vomiting,  constipation, or diarrhea.    --If your pain is not able to be controlled with the pain medication supplied to you, contact the surgeons because uncontrolled pain can be a sign of possible surgical complications.    --In case of constipation from pain medications, take over the counter Miralax powder or stool softner Senokot.  If you have a history of constipation with narcotic pain medication, consider taking Miralax powder on any day that you take a pain tablet.

## 2019-12-09 ENCOUNTER — ANESTHESIA EVENT (OUTPATIENT)
Dept: SURGERY | Facility: CLINIC | Age: 64
End: 2019-12-09
Payer: COMMERCIAL

## 2019-12-09 ASSESSMENT — LIFESTYLE VARIABLES: TOBACCO_USE: 0

## 2019-12-09 NOTE — ANESTHESIA PREPROCEDURE EVALUATION
Anesthesia Pre-Procedure Evaluation    Patient: Christopher Velazquez   MRN: [id number] : 1955          Preoperative Diagnosis: Prostate cancer (H) [C61]    Procedure(s):  ROBOTIC ASSISTED RADICAL PROSTATECTOMY (GENERAL WITH TAP ANESTHESIA)    Past Medical History:   Diagnosis Date     BCC (basal cell carcinoma of skin)     L ear     Prostate cancer (H) 10/01/2019    prostate cancer, [place] 4+4     Past Surgical History:   Procedure Laterality Date     ARTHROSCOPY SHOULDER RT/LT      L     HC CLOSED TX CLAVICULAR FX W/O MANIPULATION      R     HC REPAIR OF NASAL SEPTUM      septoplasty, rhinoplasty       Anesthesia Evaluation     . Pt has had prior anesthetic. Type: General    No history of anesthetic complications          ROS/MED HX    ENT/Pulmonary:      (-) tobacco use, sleep apnea and NICK risk factors   Neurologic:       Cardiovascular:     (+) ----. : . . . :. valvular problems/murmurs type: MR mild MR:. Previous cardiac testing Echodate:19results:Systolic and diastolic hypertension. Left ventricular systolic function is normal. The visual ejection fraction is estimated at 55-60%. There is mild concentric left ventricular hypertrophy. There is mild trileaflet aortic sclerosis. There is mild (1+) aortic regurgitation.date: results:ECG reviewed date:19 results:Sinus valentin w/ RBBB date: results:          METS/Exercise Tolerance:  >4 METS   Hematologic:         Musculoskeletal:         GI/Hepatic:         Renal/Genitourinary:         Endo:     (+) Obesity (Class 1), .      Psychiatric:         Infectious Disease:         Malignancy:   (+) Malignancy History of Prostate          Other:                          Physical Exam  Normal systems: cardiovascular, pulmonary and dental    Airway   Mallampati: II  TM distance: >3 FB  Neck ROM: full    Dental     Cardiovascular       Pulmonary             Lab Results   Component Value Date    WBC 5.2 2019    HGB 16.0 2019    HCT 47.5  "12/04/2019     12/04/2019     12/04/2019    POTASSIUM 5.1 12/04/2019    CHLORIDE 106 12/04/2019    CO2 27 12/04/2019    BUN 23 12/04/2019    CR 0.85 12/04/2019     (H) 12/04/2019    STERLING 10.1 12/04/2019       Preop Vitals  BP Readings from Last 3 Encounters:   12/04/19 132/86   11/06/19 (!) 129/99   09/09/19 (!) 142/98    Pulse Readings from Last 3 Encounters:   12/04/19 70   11/06/19 57   09/09/19 63      Resp Readings from Last 3 Encounters:   11/06/19 11   09/09/19 16   08/26/12 16    SpO2 Readings from Last 3 Encounters:   12/04/19 96%   11/06/19 93%   09/09/19 95%      Temp Readings from Last 1 Encounters:   12/04/19 36.4  C (97.6  F) (Temporal)    Ht Readings from Last 1 Encounters:   12/04/19 1.753 m (5' 9\")      Wt Readings from Last 1 Encounters:   12/04/19 98.9 kg (218 lb 1.6 oz)    Estimated body mass index is 32.21 kg/m  as calculated from the following:    Height as of 12/4/19: 1.753 m (5' 9\").    Weight as of 12/4/19: 98.9 kg (218 lb 1.6 oz).       Anesthesia Plan      History & Physical Review  History and physical reviewed and following examination; no interval change.    ASA Status:  2 .    NPO Status:  > 8 hours    Plan for General, ETT, Peripheral Nerve Block and For Post-op pain in coordination with surgeon with Intravenous and Propofol induction. Maintenance will be Balanced.    PONV prophylaxis:  Ondansetron (or other 5HT-3)  Plan for bilateral TAP block prior to extubation      Postoperative Care  Postoperative pain management:  IV analgesics, Peripheral nerve block (Single Shot) and Multi-modal analgesia.      Consents  Anesthetic plan, risks, benefits and alternatives discussed with:  Patient..                 Kt Menchaca MD  "

## 2019-12-10 ENCOUNTER — HOSPITAL ENCOUNTER (OUTPATIENT)
Facility: CLINIC | Age: 64
Discharge: HOME | End: 2019-12-11
Attending: UROLOGY | Admitting: UROLOGY
Payer: COMMERCIAL

## 2019-12-10 ENCOUNTER — ANESTHESIA (OUTPATIENT)
Dept: SURGERY | Facility: CLINIC | Age: 64
End: 2019-12-10
Payer: COMMERCIAL

## 2019-12-10 DIAGNOSIS — C61 PROSTATE CANCER (H): Primary | ICD-10-CM

## 2019-12-10 LAB
ABO + RH BLD: NORMAL
ABO + RH BLD: NORMAL
BLD GP AB SCN SERPL QL: NORMAL
BLOOD BANK CMNT PATIENT-IMP: NORMAL
SPECIMEN EXP DATE BLD: NORMAL

## 2019-12-10 PROCEDURE — 25800025 ZZH RX 258: Performed by: UROLOGY

## 2019-12-10 PROCEDURE — 25000125 ZZHC RX 250: Performed by: ANESTHESIOLOGY

## 2019-12-10 PROCEDURE — 25000125 ZZHC RX 250: Performed by: UROLOGY

## 2019-12-10 PROCEDURE — 36000085 ZZH SURGERY LEVEL 8 1ST 30 MIN: Performed by: UROLOGY

## 2019-12-10 PROCEDURE — 25000128 H RX IP 250 OP 636: Performed by: UROLOGY

## 2019-12-10 PROCEDURE — 25000132 ZZH RX MED GY IP 250 OP 250 PS 637: Performed by: PHYSICIAN ASSISTANT

## 2019-12-10 PROCEDURE — 71000012 ZZH RECOVERY PHASE 1 LEVEL 1 FIRST HR: Performed by: UROLOGY

## 2019-12-10 PROCEDURE — 86901 BLOOD TYPING SEROLOGIC RH(D): CPT | Performed by: UROLOGY

## 2019-12-10 PROCEDURE — 86850 RBC ANTIBODY SCREEN: CPT | Performed by: UROLOGY

## 2019-12-10 PROCEDURE — 25000128 H RX IP 250 OP 636: Performed by: NURSE ANESTHETIST, CERTIFIED REGISTERED

## 2019-12-10 PROCEDURE — 25000566 ZZH SEVOFLURANE, EA 15 MIN: Performed by: UROLOGY

## 2019-12-10 PROCEDURE — 88307 TISSUE EXAM BY PATHOLOGIST: CPT | Performed by: UROLOGY

## 2019-12-10 PROCEDURE — 71000013 ZZH RECOVERY PHASE 1 LEVEL 1 EA ADDTL HR: Performed by: UROLOGY

## 2019-12-10 PROCEDURE — 40000170 ZZH STATISTIC PRE-PROCEDURE ASSESSMENT II: Performed by: UROLOGY

## 2019-12-10 PROCEDURE — 88307 TISSUE EXAM BY PATHOLOGIST: CPT | Mod: 26 | Performed by: UROLOGY

## 2019-12-10 PROCEDURE — 25800030 ZZH RX IP 258 OP 636: Performed by: NURSE ANESTHETIST, CERTIFIED REGISTERED

## 2019-12-10 PROCEDURE — 37000009 ZZH ANESTHESIA TECHNICAL FEE, EACH ADDTL 15 MIN: Performed by: UROLOGY

## 2019-12-10 PROCEDURE — 36415 COLL VENOUS BLD VENIPUNCTURE: CPT | Performed by: UROLOGY

## 2019-12-10 PROCEDURE — 88309 TISSUE EXAM BY PATHOLOGIST: CPT | Performed by: UROLOGY

## 2019-12-10 PROCEDURE — 86900 BLOOD TYPING SEROLOGIC ABO: CPT | Performed by: UROLOGY

## 2019-12-10 PROCEDURE — 36000087 ZZH SURGERY LEVEL 8 EA 15 ADDTL MIN: Performed by: UROLOGY

## 2019-12-10 PROCEDURE — 37000008 ZZH ANESTHESIA TECHNICAL FEE, 1ST 30 MIN: Performed by: UROLOGY

## 2019-12-10 PROCEDURE — 27210794 ZZH OR GENERAL SUPPLY STERILE: Performed by: UROLOGY

## 2019-12-10 PROCEDURE — 25000125 ZZHC RX 250: Performed by: NURSE ANESTHETIST, CERTIFIED REGISTERED

## 2019-12-10 PROCEDURE — 88309 TISSUE EXAM BY PATHOLOGIST: CPT | Mod: 26 | Performed by: UROLOGY

## 2019-12-10 RX ORDER — LABETALOL HYDROCHLORIDE 5 MG/ML
10 INJECTION, SOLUTION INTRAVENOUS
Status: DISCONTINUED | OUTPATIENT
Start: 2019-12-10 | End: 2019-12-10 | Stop reason: HOSPADM

## 2019-12-10 RX ORDER — ACETAMINOPHEN 325 MG/1
975 TABLET ORAL EVERY 6 HOURS
Status: DISCONTINUED | OUTPATIENT
Start: 2019-12-10 | End: 2019-12-11 | Stop reason: HOSPADM

## 2019-12-10 RX ORDER — PROPOFOL 10 MG/ML
INJECTION, EMULSION INTRAVENOUS PRN
Status: DISCONTINUED | OUTPATIENT
Start: 2019-12-10 | End: 2019-12-10

## 2019-12-10 RX ORDER — HYDRALAZINE HYDROCHLORIDE 20 MG/ML
2.5-5 INJECTION INTRAMUSCULAR; INTRAVENOUS EVERY 10 MIN PRN
Status: DISCONTINUED | OUTPATIENT
Start: 2019-12-10 | End: 2019-12-10 | Stop reason: HOSPADM

## 2019-12-10 RX ORDER — SODIUM CHLORIDE, SODIUM LACTATE, POTASSIUM CHLORIDE, CALCIUM CHLORIDE 600; 310; 30; 20 MG/100ML; MG/100ML; MG/100ML; MG/100ML
INJECTION, SOLUTION INTRAVENOUS CONTINUOUS
Status: DISCONTINUED | OUTPATIENT
Start: 2019-12-10 | End: 2019-12-10 | Stop reason: HOSPADM

## 2019-12-10 RX ORDER — NEOSTIGMINE METHYLSULFATE 1 MG/ML
VIAL (ML) INJECTION PRN
Status: DISCONTINUED | OUTPATIENT
Start: 2019-12-10 | End: 2019-12-10

## 2019-12-10 RX ORDER — ONDANSETRON 2 MG/ML
4 INJECTION INTRAMUSCULAR; INTRAVENOUS EVERY 30 MIN PRN
Status: DISCONTINUED | OUTPATIENT
Start: 2019-12-10 | End: 2019-12-10 | Stop reason: HOSPADM

## 2019-12-10 RX ORDER — HYDROMORPHONE HYDROCHLORIDE 1 MG/ML
0.2 INJECTION, SOLUTION INTRAMUSCULAR; INTRAVENOUS; SUBCUTANEOUS
Status: DISCONTINUED | OUTPATIENT
Start: 2019-12-10 | End: 2019-12-11 | Stop reason: HOSPADM

## 2019-12-10 RX ORDER — VITAMIN E 268 MG
400 CAPSULE ORAL DAILY
COMMUNITY

## 2019-12-10 RX ORDER — SODIUM CHLORIDE, SODIUM LACTATE, POTASSIUM CHLORIDE, CALCIUM CHLORIDE 600; 310; 30; 20 MG/100ML; MG/100ML; MG/100ML; MG/100ML
INJECTION, SOLUTION INTRAVENOUS CONTINUOUS PRN
Status: DISCONTINUED | OUTPATIENT
Start: 2019-12-10 | End: 2019-12-10

## 2019-12-10 RX ORDER — EPHEDRINE SULFATE 50 MG/ML
INJECTION, SOLUTION INTRAMUSCULAR; INTRAVENOUS; SUBCUTANEOUS PRN
Status: DISCONTINUED | OUTPATIENT
Start: 2019-12-10 | End: 2019-12-10

## 2019-12-10 RX ORDER — ATROPA BELLADONNA AND OPIUM 16.2; 3 MG/1; MG/1
SUPPOSITORY RECTAL PRN
Status: DISCONTINUED | OUTPATIENT
Start: 2019-12-10 | End: 2019-12-10 | Stop reason: HOSPADM

## 2019-12-10 RX ORDER — LIDOCAINE 40 MG/G
CREAM TOPICAL
Status: DISCONTINUED | OUTPATIENT
Start: 2019-12-10 | End: 2019-12-11 | Stop reason: HOSPADM

## 2019-12-10 RX ORDER — NALOXONE HYDROCHLORIDE 0.4 MG/ML
.1-.4 INJECTION, SOLUTION INTRAMUSCULAR; INTRAVENOUS; SUBCUTANEOUS
Status: DISCONTINUED | OUTPATIENT
Start: 2019-12-10 | End: 2019-12-11 | Stop reason: HOSPADM

## 2019-12-10 RX ORDER — FENTANYL CITRATE 50 UG/ML
INJECTION, SOLUTION INTRAMUSCULAR; INTRAVENOUS PRN
Status: DISCONTINUED | OUTPATIENT
Start: 2019-12-10 | End: 2019-12-10

## 2019-12-10 RX ORDER — ONDANSETRON 4 MG/1
4 TABLET, ORALLY DISINTEGRATING ORAL EVERY 6 HOURS PRN
Status: DISCONTINUED | OUTPATIENT
Start: 2019-12-10 | End: 2019-12-11 | Stop reason: HOSPADM

## 2019-12-10 RX ORDER — METOCLOPRAMIDE 10 MG/1
10 TABLET ORAL EVERY 6 HOURS PRN
Status: DISCONTINUED | OUTPATIENT
Start: 2019-12-10 | End: 2019-12-11 | Stop reason: HOSPADM

## 2019-12-10 RX ORDER — ONDANSETRON 2 MG/ML
INJECTION INTRAMUSCULAR; INTRAVENOUS PRN
Status: DISCONTINUED | OUTPATIENT
Start: 2019-12-10 | End: 2019-12-10

## 2019-12-10 RX ORDER — METOCLOPRAMIDE HYDROCHLORIDE 5 MG/ML
10 INJECTION INTRAMUSCULAR; INTRAVENOUS EVERY 6 HOURS PRN
Status: DISCONTINUED | OUTPATIENT
Start: 2019-12-10 | End: 2019-12-11 | Stop reason: HOSPADM

## 2019-12-10 RX ORDER — FENTANYL CITRATE 50 UG/ML
25-50 INJECTION, SOLUTION INTRAMUSCULAR; INTRAVENOUS
Status: DISCONTINUED | OUTPATIENT
Start: 2019-12-10 | End: 2019-12-10 | Stop reason: HOSPADM

## 2019-12-10 RX ORDER — GLYCOPYRROLATE 0.2 MG/ML
INJECTION, SOLUTION INTRAMUSCULAR; INTRAVENOUS PRN
Status: DISCONTINUED | OUTPATIENT
Start: 2019-12-10 | End: 2019-12-10

## 2019-12-10 RX ORDER — OXYCODONE HYDROCHLORIDE 5 MG/1
5-10 TABLET ORAL
Status: DISCONTINUED | OUTPATIENT
Start: 2019-12-10 | End: 2019-12-11 | Stop reason: HOSPADM

## 2019-12-10 RX ORDER — ONDANSETRON 4 MG/1
4 TABLET, ORALLY DISINTEGRATING ORAL EVERY 30 MIN PRN
Status: DISCONTINUED | OUTPATIENT
Start: 2019-12-10 | End: 2019-12-10 | Stop reason: HOSPADM

## 2019-12-10 RX ORDER — NALOXONE HYDROCHLORIDE 0.4 MG/ML
.1-.4 INJECTION, SOLUTION INTRAMUSCULAR; INTRAVENOUS; SUBCUTANEOUS
Status: DISCONTINUED | OUTPATIENT
Start: 2019-12-10 | End: 2019-12-10

## 2019-12-10 RX ORDER — ONDANSETRON 2 MG/ML
4 INJECTION INTRAMUSCULAR; INTRAVENOUS EVERY 6 HOURS PRN
Status: DISCONTINUED | OUTPATIENT
Start: 2019-12-10 | End: 2019-12-11 | Stop reason: HOSPADM

## 2019-12-10 RX ORDER — VECURONIUM BROMIDE 1 MG/ML
INJECTION, POWDER, LYOPHILIZED, FOR SOLUTION INTRAVENOUS PRN
Status: DISCONTINUED | OUTPATIENT
Start: 2019-12-10 | End: 2019-12-10

## 2019-12-10 RX ORDER — PROCHLORPERAZINE MALEATE 10 MG
10 TABLET ORAL EVERY 6 HOURS PRN
Status: DISCONTINUED | OUTPATIENT
Start: 2019-12-10 | End: 2019-12-11 | Stop reason: HOSPADM

## 2019-12-10 RX ORDER — LIDOCAINE HYDROCHLORIDE 20 MG/ML
INJECTION, SOLUTION INFILTRATION; PERINEURAL PRN
Status: DISCONTINUED | OUTPATIENT
Start: 2019-12-10 | End: 2019-12-10

## 2019-12-10 RX ORDER — CEFAZOLIN SODIUM 2 G/100ML
2 INJECTION, SOLUTION INTRAVENOUS
Status: COMPLETED | OUTPATIENT
Start: 2019-12-10 | End: 2019-12-10

## 2019-12-10 RX ORDER — DEXAMETHASONE SODIUM PHOSPHATE 4 MG/ML
INJECTION, SOLUTION INTRA-ARTICULAR; INTRALESIONAL; INTRAMUSCULAR; INTRAVENOUS; SOFT TISSUE PRN
Status: DISCONTINUED | OUTPATIENT
Start: 2019-12-10 | End: 2019-12-10

## 2019-12-10 RX ORDER — SODIUM CHLORIDE, SODIUM LACTATE, POTASSIUM CHLORIDE, CALCIUM CHLORIDE 600; 310; 30; 20 MG/100ML; MG/100ML; MG/100ML; MG/100ML
INJECTION, SOLUTION INTRAVENOUS CONTINUOUS
Status: DISCONTINUED | OUTPATIENT
Start: 2019-12-10 | End: 2019-12-10

## 2019-12-10 RX ORDER — HYDROMORPHONE HYDROCHLORIDE 1 MG/ML
.3-.5 INJECTION, SOLUTION INTRAMUSCULAR; INTRAVENOUS; SUBCUTANEOUS EVERY 5 MIN PRN
Status: DISCONTINUED | OUTPATIENT
Start: 2019-12-10 | End: 2019-12-10 | Stop reason: HOSPADM

## 2019-12-10 RX ORDER — MAGNESIUM HYDROXIDE 1200 MG/15ML
LIQUID ORAL PRN
Status: DISCONTINUED | OUTPATIENT
Start: 2019-12-10 | End: 2019-12-10 | Stop reason: HOSPADM

## 2019-12-10 RX ADMIN — ACETAMINOPHEN 975 MG: 325 TABLET, FILM COATED ORAL at 21:54

## 2019-12-10 RX ADMIN — NEOSTIGMINE METHYLSULFATE 5 MG: 1 INJECTION, SOLUTION INTRAVENOUS at 13:25

## 2019-12-10 RX ADMIN — GLYCOPYRROLATE 0.8 MG: 0.2 INJECTION, SOLUTION INTRAMUSCULAR; INTRAVENOUS at 13:25

## 2019-12-10 RX ADMIN — PROPOFOL 160 MG: 10 INJECTION, EMULSION INTRAVENOUS at 10:08

## 2019-12-10 RX ADMIN — ROCURONIUM BROMIDE 50 MG: 10 INJECTION INTRAVENOUS at 10:08

## 2019-12-10 RX ADMIN — FENTANYL CITRATE 50 MCG: 50 INJECTION, SOLUTION INTRAMUSCULAR; INTRAVENOUS at 10:08

## 2019-12-10 RX ADMIN — SODIUM CHLORIDE, POTASSIUM CHLORIDE, SODIUM LACTATE AND CALCIUM CHLORIDE: 600; 310; 30; 20 INJECTION, SOLUTION INTRAVENOUS at 10:20

## 2019-12-10 RX ADMIN — VECURONIUM BROMIDE 2 MG: 1 INJECTION, POWDER, LYOPHILIZED, FOR SOLUTION INTRAVENOUS at 12:46

## 2019-12-10 RX ADMIN — SODIUM CHLORIDE, SODIUM LACTATE, POTASSIUM CHLORIDE, CALCIUM CHLORIDE: 600; 310; 30; 20 INJECTION, SOLUTION INTRAVENOUS at 10:04

## 2019-12-10 RX ADMIN — LIDOCAINE HYDROCHLORIDE 1 ML: 10 INJECTION, SOLUTION EPIDURAL; INFILTRATION; INTRACAUDAL; PERINEURAL at 09:02

## 2019-12-10 RX ADMIN — FENTANYL CITRATE 50 MCG: 50 INJECTION, SOLUTION INTRAMUSCULAR; INTRAVENOUS at 11:16

## 2019-12-10 RX ADMIN — FENTANYL CITRATE 50 MCG: 50 INJECTION, SOLUTION INTRAMUSCULAR; INTRAVENOUS at 10:38

## 2019-12-10 RX ADMIN — HYDROMORPHONE HYDROCHLORIDE 0.5 MG: 1 INJECTION, SOLUTION INTRAMUSCULAR; INTRAVENOUS; SUBCUTANEOUS at 13:15

## 2019-12-10 RX ADMIN — ONDANSETRON 4 MG: 2 INJECTION INTRAMUSCULAR; INTRAVENOUS at 13:12

## 2019-12-10 RX ADMIN — MIDAZOLAM 2 MG: 1 INJECTION INTRAMUSCULAR; INTRAVENOUS at 10:05

## 2019-12-10 RX ADMIN — LIDOCAINE HYDROCHLORIDE 100 MG: 20 INJECTION, SOLUTION INFILTRATION; PERINEURAL at 10:08

## 2019-12-10 RX ADMIN — VECURONIUM BROMIDE 2 MG: 1 INJECTION, POWDER, LYOPHILIZED, FOR SOLUTION INTRAVENOUS at 12:15

## 2019-12-10 RX ADMIN — ROCURONIUM BROMIDE 20 MG: 10 INJECTION INTRAVENOUS at 11:15

## 2019-12-10 RX ADMIN — PROPOFOL 40 MG: 10 INJECTION, EMULSION INTRAVENOUS at 10:41

## 2019-12-10 RX ADMIN — DEXAMETHASONE SODIUM PHOSPHATE 4 MG: 4 INJECTION, SOLUTION INTRA-ARTICULAR; INTRALESIONAL; INTRAMUSCULAR; INTRAVENOUS; SOFT TISSUE at 10:26

## 2019-12-10 RX ADMIN — SODIUM CHLORIDE, SODIUM LACTATE, POTASSIUM CHLORIDE, CALCIUM CHLORIDE: 600; 310; 30; 20 INJECTION, SOLUTION INTRAVENOUS at 12:06

## 2019-12-10 RX ADMIN — CEFAZOLIN SODIUM 2 G: 2 INJECTION, SOLUTION INTRAVENOUS at 10:29

## 2019-12-10 RX ADMIN — FENTANYL CITRATE 50 MCG: 50 INJECTION, SOLUTION INTRAMUSCULAR; INTRAVENOUS at 11:24

## 2019-12-10 RX ADMIN — HYDROMORPHONE HYDROCHLORIDE 0.5 MG: 1 INJECTION, SOLUTION INTRAMUSCULAR; INTRAVENOUS; SUBCUTANEOUS at 10:44

## 2019-12-10 RX ADMIN — ROCURONIUM BROMIDE 10 MG: 10 INJECTION INTRAVENOUS at 11:45

## 2019-12-10 RX ADMIN — Medication 5 MG: at 12:57

## 2019-12-10 RX ADMIN — ROCURONIUM BROMIDE 20 MG: 10 INJECTION INTRAVENOUS at 10:40

## 2019-12-10 RX ADMIN — ACETAMINOPHEN 975 MG: 325 TABLET, FILM COATED ORAL at 16:48

## 2019-12-10 RX ADMIN — CEFAZOLIN SODIUM 1 G: 2 INJECTION, SOLUTION INTRAVENOUS at 12:30

## 2019-12-10 ASSESSMENT — MIFFLIN-ST. JEOR: SCORE: 1786.93

## 2019-12-10 NOTE — BRIEF OP NOTE
[facility]    Brief Operative Note    Pre-operative diagnosis: Prostate cancer (H) [C61]  Post-operative diagnosis Same as pre-operative diagnosis, Ventral Hernia    Procedure: Procedure(s):  ROBOTIC ASSISTED RADICAL PROSTATECTOMY  open ventral hernia repair, Herniorrhaphy ventral  Surgeon: Surgeon(s) and Role:  Panel 1:     * Andrew Tobar MD - Primary     * Brian Rosales PA-C - Assisting     * Phani Will MD - Assisting    Panel 2:     * Edi Wolf MD - Primary     * Michael Benavidez PA-C - Assisting    Anesthesia: General   Estimated blood loss: 1 mL for our portion of surgery  Drains: See Urology note  Specimens:   ID Type Source Tests Collected by Time Destination   A : Bilateral Lymph Nodes, left nodes with clips Tissue Lymph Node SURGICAL PATHOLOGY EXAM Andrew Tobar MD 12/10/2019  1:15 PM    B : prostate and seminal vesicles Tissue Prostate SURGICAL PATHOLOGY EXAM Andrew Tobar MD 12/10/2019  1:16 PM      Findings:   None.  General Surgery consulted intraoperatively for Ventral Hernia.  This was small and near midline incision.  Repaired primarily with 0-Vicryl.  Complications: None.  Implants: * No implants in log *

## 2019-12-10 NOTE — ANESTHESIA CARE TRANSFER NOTE
Patient: Christopher Velazquez    Procedure(s):  ROBOTIC ASSISTED RADICAL PROSTATECTOMY  open ventral hernia repair, Herniorrhaphy ventral    Diagnosis: Prostate cancer (H) [C61]  Diagnosis Additional Information: No value filed.    Anesthesia Type:   General, ETT, Peripheral Nerve Block, For Post-op pain in coordination with surgeon     Note:  Airway :Face Mask  Patient transferred to:PACU  Comments: Neuromuscular blockade reversed after TOF 4/4, spontaneous respirations, adequate tidal volumes, followed commands to voice, oropharynx suctioned with soft flexible catheter, extubated atraumatically, extubated with suction, airway patent after extubation.  Oxygen via facemask at 8 liters per minute to PACU. Oxygen tubing connected to wall O2 in PACU, SpO2, NiBP, and EKG monitors and alarms on and functioning, Odalis Hugger warmer connected to patient gown, report on patient's clinical status given to PACU RN, RN questions answered. Handoff Report: Identifed the Patient, Identified the Reponsible Provider, Reviewed the pertinent medical history, Discussed the surgical course, Reviewed Intra-OP anesthesia mangement and issues during anesthesia, Set expectations for post-procedure period and Allowed opportunity for questions and acknowledgement of understanding      Vitals: (Last set prior to Anesthesia Care Transfer)    CRNA VITALS  12/10/2019 1320 - 12/10/2019 1357      12/10/2019             Pulse:  80    SpO2:  98 %    Resp Rate (set):  10                Electronically Signed By: SHELBY Campos CRNA  December 10, 2019  1:57 PM

## 2019-12-10 NOTE — OP NOTE
Preoperative diagnosis: Supraumbilical ventral hernia    Postoperative diagnosis: Same    Procedure: Primary repair of supraumbilical ventral hernia    Surgeon: Edi Wolf MD    Assistant: Kyle Benavidez PA-C,Physician assistant first assistant was necessary during this procedure due to expertise in patient positioning, prepping, incisional opening, retraction, exposure, and suctioning.  Anesthesia: General endotracheal    Estimated blood loss for my portion of surgery 5 cc    Specimens: None        Indication for procedure: This is a 64-year-old gentleman who is been brought to the operating room for elective robotic assisted laparoscopic prostatectomy.  He was noted on preop examination to have a supraumbilical ventral hernia.  Patient discussed this with the primary surgeon and general surgical request was made for intraoperative evaluation.    Details of procedure and intraoperative consultation: I entered the operating room at the conclusion of the robotic assisted laparoscopic prostatectomy.  Specimen was being removed through a supraumbilical vertical incision.  Once the specimen had been removed I examined the fascia.  There was a small hernia sac protruding from the inferior most aspect of the fascial incision.  This contained some preperitoneal fat.  The sac and the fat were then excised with electrocautery.  The supraumbilical trocar site appeared to be right above or through the hernia itself.  The margins of the fascia were cleaned off.  There was some preperitoneal fat that was reduced.  I then elected to close the fascia primarily with running 0 Vicryl suture.  Once this was accomplished local anesthetic was injected.  Skin incision was then closed in a layered fashion with Vicryl suture and staples on the skin.  This was well-tolerated.  There were no specimens for my portion procedure.    Edi Wolf MD

## 2019-12-10 NOTE — ANESTHESIA POSTPROCEDURE EVALUATION
Patient: Christopher Velazquez    Procedure(s):  ROBOTIC ASSISTED RADICAL PROSTATECTOMY  open ventral hernia repair, Herniorrhaphy ventral    Diagnosis:Prostate cancer (H) [C61]  Diagnosis Additional Information: No value filed.    Anesthesia Type:  General, ETT, Peripheral Nerve Block, For Post-op pain in coordination with surgeon    Note:  Anesthesia Post Evaluation    Patient location during evaluation: PACU  Patient participation: Able to fully participate in evaluation  Level of consciousness: awake  Pain management: adequate  Airway patency: patent  Cardiovascular status: acceptable  Respiratory status: acceptable  Hydration status: acceptable  PONV: none             Last vitals:  Vitals:    12/10/19 1352 12/10/19 1355 12/10/19 1400   BP: (!) 152/78  (!) 149/82   Pulse: 74 72    Resp: 16 14 14   Temp: 36.1  C (97  F)     SpO2: 95% 95% 96%         Electronically Signed By: Kt Menchaca MD  December 10, 2019  2:34 PM

## 2019-12-10 NOTE — ANESTHESIA PROCEDURE NOTES
Peripheral nerve/Neuraxial procedure note : TAP  Pre-Procedure  Performed by Kt Menchaca MD  Location: OR      Pre-Anesthestic Checklist: patient identified, IV checked, site marked, risks and benefits discussed, informed consent, monitors and equipment checked, pre-op evaluation, at physician/surgeon's request and post-op pain management    Timeout  Correct Patient: Yes   Correct Procedure: Yes   Correct Site: Yes   Correct Laterality: Yes   Correct Position: Yes   Site Marked: Yes   .   Procedure Documentation    .    Procedure: TAP, bilateral.   Patient Position:supine Local skin infiltrated with 1 mL of.    Ultrasound used to identify targeted nerve, plexus, or vascular marker and placed a needle adjacent to it., Ultrasound was used to visualize the spread of the anesthetic in close proximity to the above stated nerve. A permanent image is entered into the patient's record.  Patient Prep/Sterile Barriers; chlorhexidine gluconate and isopropyl alcohol.  .  Needle: insulated   Needle Gauge: 21.  Needle Length (millimeters) 100  Insertion Method: Single Shot.        Assessment/Narrative  Paresthesias: No.  .  The placement was negative for: blood aspirated, painful injection and site bleeding.  Bolus given via needle..   Secured via.   Complications: none. Comments:  Bolus via needle, 30 ml of 0.25% ropivacaine with 1:800,000 epinephrine, bilaterally.  Patient tolerated well, with no apparent complications.   The surgeon has given a verbal order transferring care of this patient to me for the performance of regional analgesia block for post op pain control. It is requested of me because I am uniquely trained and qualified to perform this block and the surgeon is neither trained nor qualified to perform this procedure.

## 2019-12-10 NOTE — PROGRESS NOTES
Medication History Completed by Medication Scribe  Admission medication history interview status for the 12/10/2019  admission is complete. See EPIC admission navigator for prior to admission medications     Medication history sources: Patient, Surescripts and H&P  Medication history source reliability: Good  Adherence assessment: Good    Significant changes made to the medication list:  None      Additional medication history information:   None    Medication reconciliation completed by provider prior to medication history? No    Time spent in this activity: 15 minutes      Prior to Admission medications    Medication Sig Last Dose Taking? Auth Provider   Flaxseed, Linseed, (FLAX SEED OIL) 1000 MG capsule Take 1 capsule by mouth daily 12/5/2019 at am Yes Reported, Patient   MULTI-VITAMIN OR TABS Take 1 tablet by mouth daily  12/5/2019 at am Yes Reported, Patient   Omega-3 Fatty Acids (FISH OIL PO) Take 1 capsule by mouth daily  12/5/2019 at am Yes Reported, Patient   VITAMIN D PO Take 1 Dose by mouth daily 12/5/2019 at am Yes Reported, Patient   vitamin E (TOCOPHEROL) 400 units (360 mg) capsule Take 400 Units by mouth daily 12/5/2019 at am Yes Reported, Patient

## 2019-12-10 NOTE — OP NOTE
[facility] Brief Operative Note    Pre-operative diagnosis: Prostate cancer (H) [C61]  Umbilical Hernia   Post-operative diagnosis: Same   Procedure: Procedure(s):  ROBOTIC ASSISTED RADICAL PROSTATECTOMY  open ventral hernia repair, Herniorrhaphy ventral   Surgeon: MD Edi Jeronimo MD   Assistant(s): Brian Rosales PA-C     Anesthesia: General endotracheal anesthesia   Estimated blood loss: Less than 50 ml   Total IV fluids: (See anesthesia record)   Blood transfusion: No transfusion was given during surgery   Total urine output: (See anesthesia record)   Drains: Dick-Lazaro   Specimens: Prostate, lymph nodes   Implants: None   Findings: Prostate ca  Umbilical hernia   Complications: None   Condition: Stable   Comments: See dictated operative report for full details

## 2019-12-11 VITALS
OXYGEN SATURATION: 93 % | SYSTOLIC BLOOD PRESSURE: 114 MMHG | BODY MASS INDEX: 30.95 KG/M2 | HEIGHT: 70 IN | WEIGHT: 216.2 LBS | TEMPERATURE: 98.2 F | RESPIRATION RATE: 14 BRPM | HEART RATE: 70 BPM | DIASTOLIC BLOOD PRESSURE: 68 MMHG

## 2019-12-11 LAB
ANION GAP SERPL CALCULATED.3IONS-SCNC: 4 MMOL/L (ref 3–14)
BUN SERPL-MCNC: 16 MG/DL (ref 7–30)
CALCIUM SERPL-MCNC: 8.7 MG/DL (ref 8.5–10.1)
CHLORIDE SERPL-SCNC: 106 MMOL/L (ref 94–109)
CO2 SERPL-SCNC: 29 MMOL/L (ref 20–32)
CREAT SERPL-MCNC: 0.82 MG/DL (ref 0.66–1.25)
GFR SERPL CREATININE-BSD FRML MDRD: >90 ML/MIN/{1.73_M2}
GLUCOSE SERPL-MCNC: 106 MG/DL (ref 70–99)
HGB BLD-MCNC: 14.3 G/DL (ref 13.3–17.7)
POTASSIUM SERPL-SCNC: 4.2 MMOL/L (ref 3.4–5.3)
SODIUM SERPL-SCNC: 139 MMOL/L (ref 133–144)

## 2019-12-11 PROCEDURE — 25000132 ZZH RX MED GY IP 250 OP 250 PS 637: Performed by: PHYSICIAN ASSISTANT

## 2019-12-11 PROCEDURE — 85018 HEMOGLOBIN: CPT | Performed by: PHYSICIAN ASSISTANT

## 2019-12-11 PROCEDURE — 80048 BASIC METABOLIC PNL TOTAL CA: CPT | Performed by: PHYSICIAN ASSISTANT

## 2019-12-11 PROCEDURE — 36415 COLL VENOUS BLD VENIPUNCTURE: CPT | Performed by: PHYSICIAN ASSISTANT

## 2019-12-11 RX ORDER — OXYCODONE HYDROCHLORIDE 5 MG/1
5-10 TABLET ORAL
Qty: 6 TABLET | Refills: 0 | Status: SHIPPED | OUTPATIENT
Start: 2019-12-11 | End: 2023-03-22

## 2019-12-11 RX ORDER — AMOXICILLIN 250 MG
1 CAPSULE ORAL 2 TIMES DAILY PRN
Qty: 30 TABLET | Refills: 0 | Status: SHIPPED | OUTPATIENT
Start: 2019-12-11 | End: 2023-03-22

## 2019-12-11 RX ORDER — ACETAMINOPHEN 325 MG/1
325-650 TABLET ORAL EVERY 4 HOURS PRN
Start: 2019-12-11

## 2019-12-11 RX ADMIN — ACETAMINOPHEN 975 MG: 325 TABLET, FILM COATED ORAL at 10:40

## 2019-12-11 RX ADMIN — ACETAMINOPHEN 975 MG: 325 TABLET, FILM COATED ORAL at 04:03

## 2019-12-11 NOTE — PROGRESS NOTES
Patient discharged at about 1215. Discharge paperwork reviewed. All questions answered. Lopes teaching provided, including video and hands on demonstration. Switched lopes to leg bag. Extra supplies given. Medications filled at hospital pharmacy and sent home with patient. All belongings/valuables with patient at time of departure. Patient's spouse and daughter to transport patient home. Urology clinic to contact patient to set up follow up appointment, but telephone number included in paperwork for patient to contact if questions arise. Patient left floor via wheelchair and transport NA.

## 2019-12-11 NOTE — PROGRESS NOTES
"General Surgery Note    Stable S/P Primary Ventral Hernia Repair at time of Prostatectomy  POD1    Doing well.  No complaints.  Described surgery and repair.  No mesh.  Discussed gradual return to activities and lifting restrictions.  No follow up needed but may call our clinic with any questions or concerns.  Discharge per primary team.  F/U with Dr. Tobar per routine.    NAD, very pleasant, A&O  /68 (BP Location: Left arm)   Pulse 70   Temp 98.2  F (36.8  C) (Oral)   Resp 14   Ht 1.778 m (5' 10\")   Wt 98.1 kg (216 lb 3.2 oz)   SpO2 93%   BMI 31.02 kg/m      Intake/Output Summary (Last 24 hours) at 12/11/2019 1038  Last data filed at 12/11/2019 0845  Gross per 24 hour   Intake 2500 ml   Output 1705 ml   Net 795 ml     Abd: soft, approp tenderness, ND, no BS  Inc: CDI  "

## 2019-12-11 NOTE — PLAN OF CARE
A &O x 4, VSS. C/o some abdominal discomfort, gave scheduled tylenol and heat packs. Lap sites covered w/gauze, CDI. MARJ dressing w/shadow drainage, output sanguinous. Cutler patent w/watermelon output, darkens with activity. Did get up w/SBA and ambulated in room, tolerated well, no nausea. Educated on IS and ambulation. Full liquid but currently only tolerating clears. BS hypo, no gas.

## 2019-12-11 NOTE — PROGRESS NOTES
[facility]    Urology Progress Note     Assessment & Plan  Christopher Velazquez is a 64 year old male POD#1 RALP for prostate cancer. AVSS, afebrile. UOP 1550. MARJ 100. No nausea. Tolerating clears. Pain controlled with tylenol alone.     Plan:    Continue lopes catheter. DO NOT REMOVE.    RN to provide leg bag and teach lopes cares    OK to remove MARJ    Encourage ambulation, PO fluids.     ADAT    Discharge later today if tolerating diet and pain controlled.     Brian Rosales PA-C 12/11/2019 9:22 AM  [facility]  Mon-Fri, 7am - 4pm  Office: [phone]      Interval History   Denies any nausea. Ambulated x2. Tolerating clears. No flatus or BM. Pain controlled with tylenol alone. Using IS. No chest pain, dyspnea, or calf pain.     Physical Exam   Temp: 98.2  F (36.8  C) Temp src: Oral BP: 114/68 Pulse: 70 Heart Rate: 57 Resp: 14 SpO2: 93 % O2 Device: None (Room air) Oxygen Delivery: 2 LPM  Vitals:    12/10/19 0844   Weight: 98.1 kg (216 lb 3.2 oz)     Vital Signs with Ranges  Temp:  [96.4  F (35.8  C)-99.6  F (37.6  C)] 98.2  F (36.8  C)  Pulse:  [56-74] 70  Heart Rate:  [57-73] 57  Resp:  [9-17] 14  BP: (114-158)/(67-92) 114/68  SpO2:  [92 %-98 %] 93 %  I/O last 3 completed shifts:  In: 2500 [I.V.:2500]  Out: 1700 [Urine:1550; Drains:100; Blood:50]    General: Patient awake, alert, NAD, lying in bed  Head: Normocephalic, atraumatic   Eyes: No icterus  Neck: Symmetric  Respiratory: Breathing unlabored  Cardiac: Skin well-perfused  GI: Soft, NT, non-distended, no SP tenderness, MARJ serosang  Genitourinary: Lopes in place draining clear urine, No penoscrotal edema  Skin: Dressings dry, no visible rashes  Extremities: No LE edema, no calf pain  Neurologic: No focal deficits  Neuropsychiatric: A&O x 3, responding appropriately      Medications       acetaminophen  975 mg Oral Q6H     sodium chloride (PF)  3 mL Intracatheter Q8H       Data   Results for orders placed or performed during the hospital  encounter of 12/10/19 (from the past 24 hour(s))   Basic metabolic panel   Result Value Ref Range    Sodium 139 133 - 144 mmol/L    Potassium 4.2 3.4 - 5.3 mmol/L    Chloride 106 94 - 109 mmol/L    Carbon Dioxide 29 20 - 32 mmol/L    Anion Gap 4 3 - 14 mmol/L    Glucose 106 (H) 70 - 99 mg/dL    Urea Nitrogen 16 7 - 30 mg/dL    Creatinine 0.82 0.66 - 1.25 mg/dL    GFR Estimate >90 >60 mL/min/[1.73_m2]    GFR Estimate If Black >90 >60 mL/min/[1.73_m2]    Calcium 8.7 8.5 - 10.1 mg/dL   Hemoglobin   Result Value Ref Range    Hemoglobin 14.3 13.3 - 17.7 g/dL

## 2019-12-11 NOTE — PLAN OF CARE
POD #1. A&Ox4. VSS on RA. C/o mild abd pain controlled w/ Tylenol + heat packs. Denies nausea. BS hypoactive, no gas. PIV ROSALINO Cutler patent, OP watermelon. MARJ w/ bloody OP. Lap sites covered, CDI. SBA, ambulated this AM.

## 2019-12-11 NOTE — PLAN OF CARE
Arrived to floor at 1530. A/Ox4, sleepy. VSS on 3L O2 - satting low 90s. Shallow respirations. Capno WDL. LS dim/clear. Denies pain other than fullness/gas pain. Scheduled tylenol given.  Stood at bedside, became dizzy and nauseas - dry heaved but no emesis, nausea improved once back in bed. BS hypo active. Lap site dressings WDL. MARJ dressing with small amount of shadowing - to bulb suction, bloody/red output. Cutler with light red output. Full liquid diet, has only had clears. Family at bedside.

## 2019-12-12 LAB — COPATH REPORT: NORMAL

## 2019-12-19 ENCOUNTER — TRANSFERRED RECORDS (OUTPATIENT)
Dept: HEALTH INFORMATION MANAGEMENT | Facility: CLINIC | Age: 64
End: 2019-12-19

## 2020-01-03 NOTE — PROCEDURES
Post-operative Note    Preoperative Diagnosis:  Prostate Cancer  Postoperative Diagnosis:  Prostate Cancer    Procedure:  Robot Assisted Radical Prostatectomy with pelvic lymphadenectomy                       Supra umbilical ventral hernia repair by Dr Wolf (See his dictation)       Surgeon(s):  Andrew Tobar MD  Assistant Surgeon(s):   Brian Rosales PA-C Ugarte, Roland, MD Craig, Phillip James, PA-C Pierce, Edi Ludwig MD ( see dictation)  Date of Procedure: 12/10/2019    PSA: 14  PROSTATE VOLUME: 40 cc  PATHOLOGY:  Adenocarcinoma, Hermann grade 8  Right Clio: 4/4  Right Mid:   Right Base:   Left Clio: 4/4  Left Mid:   Left Base:     POTENCY SPARING:  Lele  RISK FACTORS:   Body mass index is 31.02 kg/m .   Hernia:  Umbilical       PORT PLACEMENT:  Standard 6 ports     After discussing all treatment options, the patient elected to proceed with robotic prostatectomy.  The patient understands that we will proceed with robotic prostatectomy, but will convert to open prostatectomy if needed.    DESCRIPTION OF PROCEDURE:    The patient was identified and was brougt to the operating room.  Hewas placed on a Gelfoam padded table.  After adequate general anesthesia, he was placed in low stirrups.  Pneumatic compression stockings had been applied.  All the pressure points were adequately padded.  Shoulder braces were used, these were appropriately positioned not to cause any pressure.  The patient was then prepped and draped in the usual manner.  Time out was called.  An 18 Palauan lopes catheter was placed with in the sterile field and the bladder was emptied. The pt has suprs umbilical ventral hernia.  I made a small supra umbilical incision.  Stay sutures were placed on the fascia.  Using a Veress needle, pneumoperitoneum was achieved.  I then placed a 12 mm port at this site.  Initial endoscopic inspection with a 0 degree lens showed findings as noted above.  The remaining ports were then placed.  Two 8 mm  ports were placed on either side 10 cm from the umbilical port.  A 12 mm port was placed in the right lower quadrant above the anterior superior iliac spine, and a similar location on the left side an 8 mm port was placed.  A 8 mm airseal/suction port was placed lateral to the camera port on the right side.  With all the ports in place the patient was placed in steep trendelenburg position and the robot was docked.    I performed the robotic prostatectomy in my standard fashion.  4 arms were used.  The surgery was performed using a 0 degree lens.  Guarded scissors in arm 1, PK coagulator in arm 2 and prograsp in arm 3.  I first incised the peritonium at the bladder base.  The seminal vesicles and vas deferens were identified and dissected free and the plane between the prostate and rectum was identified and seperated as distal and lateral as possible.  Next peritoneal incisions were made lateral to the medial umbilical ligaments and the bladder was dissected away from the anterior abdominal wall and pubic ramus to expose the prostate.  The superficial dorsal vein was cauterized and transected.  The endopelvic fascia was opened.  I then proceeded to dissect the tissue away from the apex of the prostate down to the deep dorsal vein complex.  I then placed a 0 Vicryl suture on a CT-1 needle to ligate the deep dorsal complex.      Next the anterior bladder wall was incised at the level of the bladder neck.  The lopes catheter was extracted from the bladder and placed to traction using the third arm.  The posterior bladder neck was then excised and dissected away from the base of the prostate.  The previously dissected seminal vesicles and vas deferens were now brought into view.  These structures were then tented up.  The plane between the prostate and the rectum was further dissected up to the apex of the prostate.  This exposed the lateral pedicles.      I then proceeded to identify the plane between the lateral  prostate and the lateral pedicles. Hem-O-Cat clips and Vessel sealer were used and the lateral pedicle were transected.   Nerve preservation was performed.  The dissection was carried around the apex of the prostate.  Next  I transected the deep dorsal vein and further dissection was carried around the apex of the prostate.  The urethra was transected distal to the apex of the prostate and the prostate was placed in am Endopouch.  Dario-Seal was used to achieve further hemostasis.    I than performed bilateral pelvic lymphadenectomy. The nodes along the right iliac and obturator chain were removed. Clinically they were not enlarged and appeared benign. They were sent for permanent pathologic analysis.    Next, I proceeded to anastomosis the bladder neck to the urethra using a running suture of 3-0 Monocryl on a double armed needle.  A two layer anastomosis was performed posteriorly.  A 20 [place]ese lopes catheter was placed and the bladder irrigated well.  A good watertight , tension free anastasmosis was obtained.  Further inspection at this time showed no further bleeding.  The string of the Endopouch was then brought out through the umbilical port site.   The robot was then docked away.  A 15 [place]ese round Dick Lazaro was placed through the left lower quadrant port and all the laparoscopic ports were removed.  I slightly extended the supraumbilical incision and the prostate was removed through this site.  The fascia was then closed using Vicryl sutures.  The skin incisions were closed using staples.  The Dick Lazaro was secured to the skin with a silk stitch.  The needle, sponge and lap counts were correct.  The patient remained stable throughout the entire procedure.  The estimated blood loss was 50 cc . The patient tolerated the procedure well and was sent to the recovery room in stable condition.

## 2021-02-09 ENCOUNTER — TRANSFERRED RECORDS (OUTPATIENT)
Dept: HEALTH INFORMATION MANAGEMENT | Facility: CLINIC | Age: 66
End: 2021-02-09

## 2023-03-22 ENCOUNTER — OFFICE VISIT (OUTPATIENT)
Dept: INTERNAL MEDICINE | Facility: CLINIC | Age: 68
End: 2023-03-22
Payer: COMMERCIAL

## 2023-03-22 VITALS
SYSTOLIC BLOOD PRESSURE: 124 MMHG | DIASTOLIC BLOOD PRESSURE: 90 MMHG | WEIGHT: 228.7 LBS | OXYGEN SATURATION: 97 % | BODY MASS INDEX: 32.02 KG/M2 | HEIGHT: 71 IN | HEART RATE: 57 BPM | TEMPERATURE: 97.9 F

## 2023-03-22 DIAGNOSIS — C61 PROSTATE CANCER (H): ICD-10-CM

## 2023-03-22 DIAGNOSIS — M72.0 CONTRACTURE OF PALMAR FASCIA: Primary | ICD-10-CM

## 2023-03-22 PROCEDURE — 99203 OFFICE O/P NEW LOW 30 MIN: CPT | Performed by: INTERNAL MEDICINE

## 2023-03-22 ASSESSMENT — PAIN SCALES - GENERAL: PAINLEVEL: NO PAIN (0)

## 2023-03-22 NOTE — PROGRESS NOTES
"  Assessment & Plan     (M72.0) Contracture of palmar fascia  (primary encounter diagnosis)  Comment: Contracted palmar fascia/Dupuytren's contracture.  Discussed general issues of palmar fascia contractures.  Referral to hand orthopedist.  May start with steroid injections, hand therapy.  Unfortunately if progresses may require surgery.  Plan: Orthopedic  Referral            (C61) Prostate cancer (H)  Comment: Known issue that I take into account for their medical decisions; no current exacerbations, or new concerns.  Seems to doing well since his cancer surgery in 2019  This condition is currently controlled on the current medical regimen.  Continue current therapy.   Plan:                 BMI:   Estimated body mass index is 32.35 kg/m  as calculated from the following:    Height as of this encounter: 1.791 m (5' 10.5\").    Weight as of this encounter: 103.7 kg (228 lb 11.2 oz).           Bhavesh Herrera MD  [facility] GREG White is a 67 year old, presenting for the following health issues:  Hand Problem (Possible cyst)  No flowsheet data found.  HPI     Concern - lump  Onset: noticed  Nov. 2022  Description: palm of left hand between first and second fingers  Intensity: mild problem  Progression of Symptoms:  same and constant  Accompanying Signs & Symptoms: none  Previous history of similar problem: no  Precipitating factors:        Worsened by: none  Alleviating factors:        Improved by: none  Therapies tried and outcome: None      2.  History of prostate cancer, status post robotic assisted da Alex prostatectomy 2019.  Has been having regular follow-up with urology since that time with undetectable PSAs.  Reports no incontinence, normal erectile function.    **I reviewed the information recorded in the patient's EPIC chart (including but not limited to medical history, surgical history, family history, problem list, medication list, and allergy " "list) and updated the information as indicated based on the patients reported information.           Review of Systems   Constitutional, HEENT, cardiovascular, pulmonary, gi and gu systems are negative, except as otherwise noted.      Objective    BP (!) 124/90   Pulse 57   Temp 97.9  F (36.6  C) (Oral)   Ht 1.791 m (5' 10.5\")   Wt 103.7 kg (228 lb 11.2 oz)   SpO2 97%   BMI 32.35 kg/m    Body mass index is 32.35 kg/m .  Physical Exam   GENERAL alert and no distress  EYES:  Normal sclera,conjunctiva, EOMI  HENT: facies symmetric  HAND: Nodular contracture of the palmar fascia of the left palm involving the tendons of the third finger  Able to move fingers freely, make a fist.  MS: extremities- no other gross deformities of the visible extremities noted,   PSYCH: Alert and oriented times 3; speech- coherent  SKIN:  No obvious significant skin lesions on visible portions of face   NEURO:  Normal facial movements.  Appears to move normally                     "

## 2023-03-22 NOTE — PATIENT INSTRUCTIONS
Contracted palmar fascia     Referral to orthopedic surgery hand surgeon (Dr. Cody or Robert):    [facility] [address] [address]   Phone: [phone]   Fax: [phone]

## 2024-12-02 ENCOUNTER — OFFICE VISIT (OUTPATIENT)
Dept: SURGERY | Facility: CLINIC | Age: 69
End: 2024-12-02
Payer: COMMERCIAL

## 2024-12-02 ENCOUNTER — TELEPHONE (OUTPATIENT)
Dept: SURGERY | Facility: CLINIC | Age: 69
End: 2024-12-02

## 2024-12-02 VITALS
RESPIRATION RATE: 16 BRPM | OXYGEN SATURATION: 98 % | DIASTOLIC BLOOD PRESSURE: 84 MMHG | HEIGHT: 70 IN | HEART RATE: 52 BPM | WEIGHT: 190 LBS | BODY MASS INDEX: 27.2 KG/M2 | SYSTOLIC BLOOD PRESSURE: 142 MMHG

## 2024-12-02 DIAGNOSIS — K43.2 INCISIONAL HERNIA, WITHOUT OBSTRUCTION OR GANGRENE: Primary | ICD-10-CM

## 2024-12-02 NOTE — TELEPHONE ENCOUNTER
Type of surgery: Robotic assisted incisional hernia repair  Location of surgery: [facility]  Date and time of surgery: 1/10/25 at 10:30am  Surgeon: Dr. Edi Wolf  Pre-Op Appt Date: patient to schedule  Post-Op Appt Date: patient to schedule   Packet sent out: Yes  Pre-cert/Authorization completed:  Not Applicable  Date: 12/2/24

## 2024-12-02 NOTE — LETTER
"December 19, 2024          Jesse Betts MD  [facility]  [address] [place] [address]      RE:   Christopher Velazquez 1955      Dear Colleague,    Thank you for referring your patient, Christopher Velazquez, to [facility]. Please see a copy of my visit note below.    This patient is a 69-year-old gentleman previously known to me due to intraoperative consultation for ventral hernia during the performance of prostatectomy.  This was several years ago.  I ultimately repaired his hernia primarily with the known increased risk of  recurrence.  He has noted the development of a recurrent bulge.  No signs or symptoms that suggest incarceration or strangulation.  No GI or bowel obstruction symptoms.     PMH:   has a past medical history of BCC (basal cell carcinoma of skin), Migraines, and Prostate cancer (H) (10/01/2019).  PSH:    has a past surgical history that includes REPAIR OF NASAL SEPTUM (1994); CLOSED RX CLAVICLE FRACTURE (2003); arthroscopy shoulder rt/lt (1999); colonoscopy; Davinci prostatectomy (N/A, 12/10/2019); and Herniorrhaphy ventral (N/A, 12/10/2019).  Social History:   reports that he has never smoked. He has never used smokeless tobacco. He reports current alcohol use. He reports that he does not use drugs.  Family History:  family history includes Family History Negative in his brother, brother, father, sister, and sister; Lung Cancer (age of onset: 60) in his sister; Neurologic Disorder in his mother; Pancreatic Cancer (age of onset: 54) in his brother; Valvular heart disease (age of onset: 68) in his brother.  Medications/Allergies: Home medications and allergies reviewed.     ROS:  The 10 point Review of Systems is negative other than noted in the HPI.     Physical Exam:  BP (!) 142/84   Pulse 52   Resp 16   Ht 1.778 m (5' 10\")   Wt 86.2 kg (190 lb)   SpO2 98%   BMI 27.26 kg/m    GENERAL: Generally appears well.  Psych: Alert and Oriented.  " Normal affect  Eyes: Sclera clear  Respiratory:  Lungs clear to ausculation bilaterally with good air excursion  Cardiovascular:  Regular Rate and Rhythm with no murmurs gallops or rubs, normal peripheral pulses  GI: Abdomen Non Distended Soft Non-Tender  Incisional hernia palpated..  Lymphatic/Hematologic/Immune:  No femoral or cervical lymphadenopathy.  Integumentary:  No rashes  Neurological: grossly intact      All new lab and imaging data was reviewed.      Impression and Plan:  Patient is a 69 year old male with incisional hernia     PLAN: Recommend robotic assisted laparoscopic repair at his convenience  I discussed the pathophysiology of hernias and options for repair including laparoscopic VS open.  The risks associated with the procedure including, but not limited to, recurrence, nerve entrapment or injury, persistence of pain, injury to the bowel/bladder, infertility, hematoma, mesh migration, mesh infection, MI, and PE were discussed with the patient. He indicated understanding of the discussion, asked appropriate questions, and provided consent. Signs and symptoms of incarceration were discussed. If these develop in the interim, he promises to call or go straight to the ER. I have provided the patient with an information pamphlet.       Again, thank you for allowing me to participate in the care of your patient.      Sincerely,      Edi Wolf MD

## 2024-12-19 NOTE — PROGRESS NOTES
"[facility]  Surgery Consultation    Primary care provider:  Bhavesh Herrera [phone]  Consultation requested by:    Jesse Betts MD       HPI: This patient is a 69-year-old gentleman previously known to me due to intraoperative consultation for ventral hernia during the performance of prostatectomy.  This was several years ago.  I ultimately repaired his hernia primarily with the known increased risk of  recurrence.  He has noted the development of a recurrent bulge.  No signs or symptoms that suggest incarceration or strangulation.  No GI or bowel obstruction symptoms.    PMH:   has a past medical history of BCC (basal cell carcinoma of skin), Migraines, and Prostate cancer (H) (10/01/2019).  PSH:    has a past surgical history that includes REPAIR OF NASAL SEPTUM (1994); CLOSED RX CLAVICLE FRACTURE (2003); arthroscopy shoulder rt/lt (1999); colonoscopy; Davinci prostatectomy (N/A, 12/10/2019); and Herniorrhaphy ventral (N/A, 12/10/2019).  Social History:   reports that he has never smoked. He has never used smokeless tobacco. He reports current alcohol use. He reports that he does not use drugs.  Family History:  family history includes Family History Negative in his brother, brother, father, sister, and sister; Lung Cancer (age of onset: 60) in his sister; Neurologic Disorder in his mother; Pancreatic Cancer (age of onset: 54) in his brother; Valvular heart disease (age of onset: 68) in his brother.  Medications/Allergies: Home medications and allergies reviewed.    ROS:  The 10 point Review of Systems is negative other than noted in the HPI.    Physical Exam:  BP (!) 142/84   Pulse 52   Resp 16   Ht 1.778 m (5' 10\")   Wt 86.2 kg (190 lb)   SpO2 98%   BMI 27.26 kg/m    GENERAL: Generally appears well.  Psych: Alert and Oriented.  Normal affect  Eyes: Sclera clear  Respiratory:  Lungs clear to ausculation bilaterally with good air excursion  Cardiovascular:  Regular " Rate and Rhythm with no murmurs gallops or rubs, normal peripheral pulses  GI: Abdomen Non Distended Soft Non-Tender  Incisional hernia palpated..  Lymphatic/Hematologic/Immune:  No femoral or cervical lymphadenopathy.  Integumentary:  No rashes  Neurological: grossly intact     All new lab and imaging data was reviewed.     Impression and Plan:  Patient is a 69 year old male with incisional hernia    PLAN: Recommend robotic assisted laparoscopic repair at his convenience  I discussed the pathophysiology of hernias and options for repair including laparoscopic VS open.  The risks associated with the procedure including, but not limited to, recurrence, nerve entrapment or injury, persistence of pain, injury to the bowel/bladder, infertility, hematoma, mesh migration, mesh infection, MI, and PE were discussed with the patient. He indicated understanding of the discussion, asked appropriate questions, and provided consent. Signs and symptoms of incarceration were discussed. If these develop in the interim, he promises to call or go straight to the ER. I have provided the patient with an information pamphlet.    Thank you very much for this consult.    Edi Wolf M.D.  [facility]  [phone]    Please route or send letter to:  Primary Care Provider (PCP) and Referring Provider

## (undated) DEVICE — SUCTION IRR STRYKERFLOW II W/TIP 250-070-520

## (undated) DEVICE — KIT PATIENT POSITIONING PIGAZZI LATEX FREE 40580

## (undated) DEVICE — PACK DAVINCI UROLOGY SBA15UDFSG

## (undated) DEVICE — DAVINCI XI GRASPER ENDOWRIST BIPOLAR LONG 470400

## (undated) DEVICE — SU MONOCRYL 3-0 RB-1 27" Y305H

## (undated) DEVICE — ESU GROUND PAD UNIVERSAL W/O CORD

## (undated) DEVICE — GLOVE PROTEXIS BLUE W/NEU-THERA 8.5  2D73EB85

## (undated) DEVICE — ENDO POUCH UNIV RETRIEVAL SYSTEM INZII 10MM CD001

## (undated) DEVICE — SU MONOCRYL 3-0 RB-1 27" UND Y215H

## (undated) DEVICE — LINEN TOWEL PACK X5 5464

## (undated) DEVICE — ENDO TROCAR FIRST ENTRY KII FIOS Z-THRD 12X100MM CTF73

## (undated) DEVICE — NDL INSUFFLATION 13GA 120MM C2201

## (undated) DEVICE — DRAIN CHANNEL ROUND 15FR FLUTED 072188

## (undated) DEVICE — DAVINCI XI NDL DRIVER LARGE 470006

## (undated) DEVICE — SET EXTENSION MICROBORE 2.0ML 73" EB-072-01

## (undated) DEVICE — SOL NACL 0.9% INJ 1000ML BAG 2B1324X

## (undated) DEVICE — Device

## (undated) DEVICE — SUCTION CANISTER MEDIVAC LINER 3000ML W/LID 65651-530

## (undated) DEVICE — CLIP ENDO HEMO-LOC PURPLE LG 544240

## (undated) DEVICE — DAVINCI XI HANDPIECE ESU VESSEL SEALER 8MM EXT 480422

## (undated) DEVICE — SU VICRYL 0 CT-1 27" J340H

## (undated) DEVICE — CATH FOLEY 2WAY 20FR 30ML LUBRICATH LATEX 0166L20

## (undated) DEVICE — WIPES FOLEY CARE SURESTEP PROVON DFC100

## (undated) DEVICE — SU VICRYL 0 UR-6 27" J603H

## (undated) DEVICE — SU VICRYL 0 UR-5 27" J376H

## (undated) DEVICE — DAVINCI XI DRAPE ARM 470015

## (undated) DEVICE — SU VICRYL 2-0 SH 27" J317H

## (undated) DEVICE — SU ETHILON 2-0 FS 18" 664H

## (undated) DEVICE — GLOVE PROTEXIS W/NEU-THERA 8.0  2D73TE80

## (undated) DEVICE — SURGICEL HEMOSTAT 3X4" NUKNIT 1943

## (undated) DEVICE — GRASPER LAPAROSCOPIC EPIX 5MMX35CM C4130

## (undated) DEVICE — DAVINCI HOT SHEARS TIP COVER  400180

## (undated) DEVICE — CATH TRAY FOLEY SURESTEP 16FR WDRAIN BAG STLK LATEX A300316A

## (undated) DEVICE — DAVINCI XI SEAL UNIVERSAL 5-8MM 470361

## (undated) DEVICE — SOL WATER IRRIG 1000ML BOTTLE 2F7114

## (undated) DEVICE — BLADE CLIPPER 4406

## (undated) DEVICE — DAVINCI XI DRAPE COLUMN 470341

## (undated) DEVICE — DAVINCI XI MONOPOLAR SCISSORS HOT SHEARS 8MM 470179

## (undated) DEVICE — TUBING CONMED AIRSEAL SMOKE EVAC INSUFFLATION ASM-EVAC

## (undated) DEVICE — DAVINCI XI GRASPER ENDOWRIST PROGRASP 470093

## (undated) DEVICE — DRAIN JACKSON PRATT RESERVOIR 100ML SU130-1305

## (undated) RX ORDER — GLYCOPYRROLATE 0.2 MG/ML
INJECTION, SOLUTION INTRAMUSCULAR; INTRAVENOUS
Status: DISPENSED
Start: 2019-12-10

## (undated) RX ORDER — CEFAZOLIN SODIUM 2 G/100ML
INJECTION, SOLUTION INTRAVENOUS
Status: DISPENSED
Start: 2019-12-10

## (undated) RX ORDER — DEXAMETHASONE SODIUM PHOSPHATE 4 MG/ML
INJECTION, SOLUTION INTRA-ARTICULAR; INTRALESIONAL; INTRAMUSCULAR; INTRAVENOUS; SOFT TISSUE
Status: DISPENSED
Start: 2019-12-10

## (undated) RX ORDER — BUPIVACAINE HYDROCHLORIDE 2.5 MG/ML
INJECTION, SOLUTION EPIDURAL; INFILTRATION; INTRACAUDAL
Status: DISPENSED
Start: 2019-12-10

## (undated) RX ORDER — LIDOCAINE HYDROCHLORIDE 20 MG/ML
INJECTION, SOLUTION EPIDURAL; INFILTRATION; INTRACAUDAL; PERINEURAL
Status: DISPENSED
Start: 2019-12-10

## (undated) RX ORDER — HYDROMORPHONE HYDROCHLORIDE 1 MG/ML
INJECTION, SOLUTION INTRAMUSCULAR; INTRAVENOUS; SUBCUTANEOUS
Status: DISPENSED
Start: 2019-12-10

## (undated) RX ORDER — CEFAZOLIN SODIUM 1 G/3ML
INJECTION, POWDER, FOR SOLUTION INTRAMUSCULAR; INTRAVENOUS
Status: DISPENSED
Start: 2019-12-10

## (undated) RX ORDER — FENTANYL CITRATE 50 UG/ML
INJECTION, SOLUTION INTRAMUSCULAR; INTRAVENOUS
Status: DISPENSED
Start: 2019-11-06

## (undated) RX ORDER — PROPOFOL 10 MG/ML
INJECTION, EMULSION INTRAVENOUS
Status: DISPENSED
Start: 2019-12-10

## (undated) RX ORDER — ATROPA BELLADONNA AND OPIUM 16.2; 3 MG/1; MG/1
SUPPOSITORY RECTAL
Status: DISPENSED
Start: 2019-12-10

## (undated) RX ORDER — NEOSTIGMINE METHYLSULFATE 1 MG/ML
VIAL (ML) INJECTION
Status: DISPENSED
Start: 2019-12-10

## (undated) RX ORDER — ONDANSETRON 2 MG/ML
INJECTION INTRAMUSCULAR; INTRAVENOUS
Status: DISPENSED
Start: 2019-12-10

## (undated) RX ORDER — FENTANYL CITRATE 50 UG/ML
INJECTION, SOLUTION INTRAMUSCULAR; INTRAVENOUS
Status: DISPENSED
Start: 2019-12-10